# Patient Record
Sex: MALE | Race: WHITE | NOT HISPANIC OR LATINO | Employment: OTHER | URBAN - METROPOLITAN AREA
[De-identification: names, ages, dates, MRNs, and addresses within clinical notes are randomized per-mention and may not be internally consistent; named-entity substitution may affect disease eponyms.]

---

## 2019-04-15 ENCOUNTER — HOSPITAL ENCOUNTER (EMERGENCY)
Facility: HOSPITAL | Age: 84
Discharge: HOME | End: 2019-04-16
Attending: EMERGENCY MEDICINE
Payer: MEDICARE

## 2019-04-15 VITALS
WEIGHT: 136 LBS | TEMPERATURE: 99 F | SYSTOLIC BLOOD PRESSURE: 134 MMHG | OXYGEN SATURATION: 96 % | DIASTOLIC BLOOD PRESSURE: 61 MMHG | RESPIRATION RATE: 16 BRPM | HEART RATE: 75 BPM

## 2019-04-15 DIAGNOSIS — S09.90XA CLOSED HEAD INJURY, INITIAL ENCOUNTER: ICD-10-CM

## 2019-04-15 DIAGNOSIS — S01.81XA LACERATION OF FOREHEAD, INITIAL ENCOUNTER: ICD-10-CM
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
## 2019-04-15 PROCEDURE — 99284 EMERGENCY DEPT VISIT MOD MDM: CPT | Mod: ER

## 2019-04-15 RX ORDER — HYDROCODONE BITARTRATE AND ACETAMINOPHEN 10; 325 MG/1; MG/1
1 TABLET ORAL EVERY 4 HOURS PRN
COMMUNITY

## 2019-04-15 RX ORDER — METOPROLOL TARTRATE 50 MG/1
50 TABLET ORAL 2 TIMES DAILY
COMMUNITY

## 2019-04-15 RX ORDER — PANTOPRAZOLE SODIUM 40 MG/1
40 TABLET, DELAYED RELEASE ORAL DAILY
COMMUNITY

## 2019-04-15 RX ORDER — TAMSULOSIN HYDROCHLORIDE 0.4 MG/1
0.4 CAPSULE ORAL DAILY
COMMUNITY

## 2019-04-15 RX ORDER — FOLIC ACID 1 MG/1
1 TABLET ORAL DAILY
COMMUNITY

## 2019-04-15 RX ORDER — AMLODIPINE BESYLATE 2.5 MG/1
2.5 TABLET ORAL 2 TIMES DAILY
COMMUNITY

## 2019-04-15 RX ORDER — NAPROXEN SODIUM 220 MG/1
162 TABLET, FILM COATED ORAL 2 TIMES DAILY
COMMUNITY

## 2019-04-16 PROCEDURE — 25000003 PHARM REV CODE 250: Mod: ER | Performed by: EMERGENCY MEDICINE

## 2019-04-16 RX ADMIN — BACITRACIN, NEOMYCIN, POLYMYXIN B 1 EACH: 400; 3.5; 5 OINTMENT TOPICAL at 12:04

## 2019-04-16 NOTE — ED NOTES
Attempted to notify nurse at [facility] of patient discharge. Placed first call, spoke with Dorothea and was transferred to a different person. No answer. I attempted to call back for the second time to which there was no answer.

## 2019-04-16 NOTE — ED NOTES
LOC: The patient is awake, alert and aware of environment with an appropriate affect, the patient is oriented x 3 and speaking appropriately.  Brought in by [facility] with report that pt was found on floor after falling from wheelchair. Fall was unwitnessed  & there was no LOC per NH staff nurse Deidre.   Patient verbally verified and Spelled Full Name and Date of Birth.   APPEARANCE: Patient resting comfortably and in no acute distress,  patient's clothing is properly fastened.  HEENT: Superficial laceration to left temporal area, positive for hematoma to left cheek, no active bleeding  SKIN: Brief WNL.   MUSCULOSKELETAL: Brief WNL  RESPIRATORY: Brief WNL  CARDIAC: Brief WNL  GASTRO: Brief WNL  : Brief WNL  Peripheral Vasc: Brief WNL  NEURO: Brief WNL  PSYCH: Brief WNL

## 2019-04-16 NOTE — ED PROVIDER NOTES
Encounter Date: 4/15/2019       History     Chief Complaint   Patient presents with    Fall     unwitnessed fall from wheelchair at NH. laceration noted to left tempel. GCS 14. baseline. Follows commands     Nursing home resident on dialysis, unwitnessed fall this evening with minor contusion and minor laceration to the left lateral forehead/ periorbital region.  He is reported at his usual mildly demented baseline with no change in status.  Patient denies complaints.  He believes he is current on his tetanus vaccine.  Family confirms that he is at his baseline mental status.  Denies pain in the back, hips, chest, abdomen, neck, or head.  Although he is able to give some history and is cooperative, he is judged incompletely reliable and does not have a memory of the events.  Nursing home staff did not witness the fall and we cannot be sure whether he was sitting, lying, or standing prior to the event.  No other injury or complaints.    The history is provided by the patient, the nursing home, the EMS personnel and a relative. No  was used.     Review of patient's allergies indicates:  No Known Allergies  Past Medical History:   Diagnosis Date    Anemia     BPH (benign prostatic hyperplasia)     CHF (congestive heart failure)     CVA (cerebral vascular accident)     Hyperlipemia     Hypertension     Renal disorder      History reviewed. No pertinent surgical history.  History reviewed. No pertinent family history.  Social History     Tobacco Use    Smoking status: Unknown If Ever Smoked    Smokeless tobacco: Never Used   Substance Use Topics    Alcohol use: Never     Frequency: Never    Drug use: Never     Review of Systems   Constitutional: Negative for chills and fever.   HENT: Negative for congestion, facial swelling, nosebleeds and sinus pressure.    Eyes: Negative for pain and redness.   Respiratory: Negative for chest tightness, shortness of breath and wheezing.     Cardiovascular: Negative for chest pain, palpitations and leg swelling.   Gastrointestinal: Negative for abdominal distention, abdominal pain, diarrhea, nausea and vomiting.   Endocrine: Negative for cold intolerance, polydipsia and polyphagia.   Genitourinary: Negative for difficulty urinating, dysuria, frequency and hematuria.   Musculoskeletal: Negative for arthralgias, back pain, myalgias and neck pain.   Skin: Positive for wound. Negative for color change and rash.   Neurological: Negative for dizziness, weakness, numbness and headaches.   Hematological: Negative for adenopathy. Does not bruise/bleed easily.   Psychiatric/Behavioral: Negative for agitation and behavioral problems.   All other systems reviewed and are negative.      Physical Exam     Initial Vitals [04/15/19 2323]   BP Pulse Resp Temp SpO2   134/61 75 16 98.7 °F (37.1 °C) 96 %      MAP       --         Physical Exam    Nursing note and vitals reviewed.  Constitutional: He appears well-developed and well-nourished. He is not diaphoretic. No distress.   HENT:   Head: Normocephalic.   Mouth/Throat: Oropharynx is clear and moist. No oropharyngeal exudate.   Minor contusion, ecchymosis, and 2 cm superficial laceration over the left lateral forehead and periorbital region.  Good hemostasis.  No sutures judged necessary.  No other findings.   Eyes: Conjunctivae and EOM are normal. Pupils are equal, round, and reactive to light. Right eye exhibits no discharge. Left eye exhibits no discharge. No scleral icterus.   Neck: Normal range of motion. Neck supple. No thyromegaly present. No tracheal deviation present. No JVD present.   Cardiovascular: Normal rate, regular rhythm and normal heart sounds. Exam reveals no gallop and no friction rub.    No murmur heard.  Right upper arm dialysis shunt with good thrill, bruit, no sign of infection   Pulmonary/Chest: Breath sounds normal. No respiratory distress. He has no wheezes. He has no rhonchi. He has no  rales. He exhibits no tenderness.   Abdominal: Soft. Bowel sounds are normal. He exhibits no distension and no mass. There is no tenderness. There is no rebound and no guarding.   Musculoskeletal: Normal range of motion. He exhibits no edema or tenderness.   Well healed thoracic spine surgical scar with chronic deformity; no tenderness   Lymphadenopathy:     He has no cervical adenopathy.   Neurological: He is alert and oriented to person, place, and time. He has normal strength. No cranial nerve deficit.   Mild to moderate pleasant dementia evident   Skin: Skin is warm and dry. No rash noted. No erythema.   Psychiatric: He has a normal mood and affect. His behavior is normal. Judgment and thought content normal.         ED Course   Procedures  Labs Reviewed - No data to display       Imaging Results          CT Cervical Spine Without Contrast (In process)                CT Maxillofacial Without Contrast (In process)                CT Head Without Contrast (In process)               X-Rays:   Independently Interpreted Readings:   Other Readings:  CT scans of the head, maxillofacial bones, and cervical spine did not show any evidence for an acute traumatic process.      12:53 AM Remains perfectly stable, no change in exam, stable for return to nursing home, patient's family will take him by private vehicle.                         Clinical Impression:     1. Fall at nursing home, initial encounter    2. Laceration of forehead, initial encounter    3. Closed head injury, initial encounter         Disposition:   Disposition: Discharged  Condition: Stable                        Bernard Gonzalez MD  04/16/19 0054

## 2019-04-16 NOTE — ED NOTES
Wound to left temporal area cleansed, antbiotic oint & dressing applied.  Ace & hands cleansed of blood.  Pt remains alert & talkative, family at bedside

## 2019-07-22 ENCOUNTER — HOSPITAL ENCOUNTER (EMERGENCY)
Facility: HOSPITAL | Age: 84
Discharge: HOME | End: 2019-07-23
Attending: EMERGENCY MEDICINE
Payer: MEDICARE

## 2019-07-22 DIAGNOSIS — S00.03XA HEMATOMA OF SCALP, INITIAL ENCOUNTER: ICD-10-CM
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
## 2019-07-22 DIAGNOSIS — S09.90XA HEAD INJURY: ICD-10-CM

## 2019-07-22 DIAGNOSIS — S70.01XA CONTUSION OF RIGHT HIP, INITIAL ENCOUNTER: ICD-10-CM

## 2019-07-22 PROCEDURE — 99284 EMERGENCY DEPT VISIT MOD MDM: CPT | Mod: 25,ER

## 2019-07-23 ENCOUNTER — TELEPHONE (OUTPATIENT)
Dept: EMERGENCY MEDICINE | Facility: HOSPITAL | Age: 84
End: 2019-07-23

## 2019-07-23 VITALS
OXYGEN SATURATION: 99 % | TEMPERATURE: 98 F | HEIGHT: 67 IN | DIASTOLIC BLOOD PRESSURE: 62 MMHG | BODY MASS INDEX: 21.42 KG/M2 | RESPIRATION RATE: 20 BRPM | WEIGHT: 136.44 LBS | SYSTOLIC BLOOD PRESSURE: 135 MMHG | HEART RATE: 66 BPM

## 2019-07-23 NOTE — TELEPHONE ENCOUNTER
----- Message from Bernard Gonzalez MD sent at 7/23/2019  9:03 AM CDT -----  Radiology reading indicated a little more fluid in the lungs or possibly pneumonia; Dr. Berry had also seen the small pleural effusion on the right side.  He came in for a fall with injury, no real pulmonary complaints noted.  If no pulmonary complaints, recommend follow up with his doctor this week for a recheck.  If any pulmonary complaints, return to the ER or primary care today for a recheck.

## 2019-07-23 NOTE — ED PROVIDER NOTES
Encounter Date: 7/22/2019       History     Chief Complaint   Patient presents with    Fall     unwitnessed fall pta. unknown loc. hematoma noted to right side of head. reports falling from wheelchair.      The history is provided by the patient and the EMS personnel.   Fall   The accident occurred today. Fall occurred: wheelchair. He fell from a height of 3 to 5 ft. He landed on a hard floor. The point of impact was the head. The pain is at a severity of 3/10. He was not ambulatory at the scene. There was no entrapment after the fall. There was no drug use involved in the accident. There was no alcohol use involved in the accident. Associated symptoms include headaches. Pertinent negatives include no neck pain, no back pain, no paresthesias, no paralysis, no visual change, no fever, no numbness, no abdominal pain, no bowel incontinence, no nausea, no vomiting, no hematuria, no hearing loss, no loss of consciousness and no tingling. He has tried nothing for the symptoms.     Review of patient's allergies indicates:  No Known Allergies  Past Medical History:   Diagnosis Date    Anemia     BPH (benign prostatic hyperplasia)     CHF (congestive heart failure)     CVA (cerebral vascular accident)     Hyperlipemia     Hypertension     Renal disorder      No past surgical history on file.  No family history on file.  Social History     Tobacco Use    Smoking status: Unknown If Ever Smoked    Smokeless tobacco: Never Used   Substance Use Topics    Alcohol use: Never     Frequency: Never    Drug use: Never     Review of Systems   Constitutional: Negative for fever.   Gastrointestinal: Negative for abdominal pain, bowel incontinence, nausea and vomiting.   Genitourinary: Negative for hematuria.   Musculoskeletal: Negative for back pain and neck pain.   Neurological: Positive for headaches. Negative for tingling, loss of consciousness, numbness and paresthesias.   All other systems reviewed and are  negative.      Physical Exam     Initial Vitals [07/22/19 2316]   BP Pulse Resp Temp SpO2   130/61 (!) 18 (!) 66 97.8 °F (36.6 °C) 100 %      MAP       --         Physical Exam    Nursing note and vitals reviewed.  Constitutional: He appears well-developed and well-nourished. No distress.   HENT:   Head: Normocephalic.   Mouth/Throat: Oropharynx is clear and moist.   Frontal hematoma   Eyes: Conjunctivae and EOM are normal. Pupils are equal, round, and reactive to light.   Neck: Normal range of motion. Neck supple.   Cardiovascular: Normal rate and regular rhythm.   Pulmonary/Chest: Breath sounds normal.   Abdominal: Soft. Bowel sounds are normal.   Musculoskeletal: Normal range of motion. He exhibits tenderness.        Right hip: He exhibits tenderness. He exhibits normal range of motion.   Neurological: He is alert. He has normal strength.   Baseline confusion   Skin: Skin is warm and dry.   Psychiatric: He has a normal mood and affect.         ED Course   Procedures  Labs Reviewed - No data to display       Imaging Results          X-Ray Chest 1 View (Preliminary result)  Result time 07/23/19 00:54:34    ED Interpretation by Joon Berry MD (07/23/19 00:54:34, [facility], Emergency Medicine)    Right effusion, No acute fractures                             X-Ray Hips Bilateral 2 View Incl AP Pelvis (Preliminary result)  Result time 07/23/19 00:54:03    ED Interpretation by Joon Berry MD (07/23/19 00:54:03, [facility], Emergency Medicine)    NAF                             CT Head Without Contrast (In process)             No intracranial hem. NAD.    12:56 AM - Counseling: Spoke with the patient and discussed todays findings, in addition to providing specific details for the plan of care and counseling regarding the diagnosis and prognosis. Questions are answered at this time.     Trauma precautions were discussed with patient and/or  family/caretaker; I do not specifically detect any abdominal, thoracic, CNS, orthopedic, or other emergent or life threatening condition and that patient is safe to be discharged.  It was also discussed that despite an unrevealing examination and negative radiographic examination for serious or life threatening injury, these conditions may still exist.  As such, patient should return to ED immediately should they experience, severe or worsening pain, shortness of breath, abdominal pain, headache, vomiting, or any other concern.  It was also discussed that not infrequently, injuries may not be diagnosed during the initial ED visit (such as fractures) and that if the patient discovers a new area of concern, a new area of injury that was not evaluated in the ED, they should return for evaluation as they may have an injury that requires treatment.                            Clinical Impression:       ICD-10-CM ICD-9-CM   1. Fall W19.XXXA E888.9   2. Head injury S09.90XA 959.01   3. Hematoma of scalp, initial encounter S00.03XA 920   4. Contusion of right hip, initial encounter S70.01XA 924.01         Disposition:   Disposition: Discharged  Condition: Stable                        Joon Berry MD  07/23/19 0056

## 2019-07-23 NOTE — ED NOTES
Spoke with Mary, nurse at [facility]. States there will be someone to help assist getting the patient out of the car. Pt daughter to bring him back to nursing home.

## 2019-07-23 NOTE — ED NOTES
Patient's daughter at bedside. The patient is resting quietly. Airway is open and patent, respirations are spontaneous, normal respiratory effort and rate noted, skin warm and dry, appearance: in no acute distress and resting comfortably. Cardiac monitor in place. Side rails up x2, patient awake, alert, oriented to place, person, and situation. Disoriented to time, but patient's family states baseline.

## 2019-12-31 ENCOUNTER — HOSPITAL ENCOUNTER (EMERGENCY)
Facility: HOSPITAL | Age: 84
Discharge: HOME | End: 2019-12-31
Attending: EMERGENCY MEDICINE
Payer: MEDICARE

## 2019-12-31 VITALS
SYSTOLIC BLOOD PRESSURE: 136 MMHG | DIASTOLIC BLOOD PRESSURE: 75 MMHG | BODY MASS INDEX: 21.37 KG/M2 | HEART RATE: 79 BPM | RESPIRATION RATE: 18 BRPM | OXYGEN SATURATION: 98 % | TEMPERATURE: 98 F | WEIGHT: 136.44 LBS

## 2019-12-31 DIAGNOSIS — T07.XXXA ABRASIONS OF MULTIPLE SITES: ICD-10-CM

## 2019-12-31 DIAGNOSIS — S20.212A RIB CONTUSION, LEFT, INITIAL ENCOUNTER: ICD-10-CM

## 2019-12-31 DIAGNOSIS — S20.212A CONTUSION OF LEFT CHEST WALL: ICD-10-CM

## 2019-12-31 DIAGNOSIS — S00.81XA ABRASION OF FOREHEAD, INITIAL ENCOUNTER: ICD-10-CM

## 2019-12-31 DIAGNOSIS — S00.83XA CONTUSION OF FOREHEAD, INITIAL ENCOUNTER: Primary | ICD-10-CM

## 2019-12-31 DIAGNOSIS — S09.90XA HEAD TRAUMA: ICD-10-CM

## 2019-12-31 DIAGNOSIS — J90 CHRONIC PLEURAL EFFUSION: ICD-10-CM

## 2019-12-31 LAB — POCT GLUCOSE: 99 MG/DL (ref 70–110)

## 2019-12-31 PROCEDURE — 25000003 PHARM REV CODE 250: Mod: ER | Performed by: EMERGENCY MEDICINE

## 2019-12-31 PROCEDURE — 99285 EMERGENCY DEPT VISIT HI MDM: CPT | Mod: 25,ER

## 2019-12-31 PROCEDURE — 82962 GLUCOSE BLOOD TEST: CPT | Mod: ER

## 2019-12-31 RX ORDER — ACETAMINOPHEN 325 MG/1
325 TABLET ORAL EVERY 6 HOURS PRN
Refills: 0 | COMMUNITY
Start: 2019-12-31

## 2019-12-31 RX ORDER — ACETAMINOPHEN 500 MG
1000 TABLET ORAL
Status: COMPLETED | OUTPATIENT
Start: 2019-12-31 | End: 2019-12-31

## 2019-12-31 RX ADMIN — ACETAMINOPHEN 1000 MG: 500 TABLET ORAL at 07:12

## 2020-01-01 NOTE — DISCHARGE INSTRUCTIONS
The patient has family members that will observe him/her over the next 24 hours and that are competent to bring him/her back to the Emergency Department if concerning signs or symptoms develop. The family members are comfortable with this responsibility.  I have given detailed written and verbal instructions to the family to bring the patient back to the ED should any concerning signs such as excessive sleepiness, lethargy, confusion, unequal pupils, recurrent vomiting, seizure activity, loss of consciousness, or focal weakness develop.    Regarding CONTUSIONS, I advised patient to: REST the injured area or use it less than usual; apply ICE to decrease swelling and pain and help prevent tissue damage; use COMPRESSION with an elastic bandage to support the area and decrease swelling; ELEVATE injured body part above the level of the heart to help decrease pain and swelling; AVOID using massage or massage to acute injuries as it may slow healing of the area; AVOID drinking alcohol as it may slow healing of the injury; and avoid stretching injured muscles. Advised patient to return to the emergency department or contact primary care provider if: having trouble moving injured area; notice tingling or numbness in or near the injured area; extremity below the bruise gets cold or turns pale; a new lump develops in the injured area; symptoms do not improve with treatment after 4 to 5 days; there is any questions or concerns about the condition or treatment plan.     For RIB CONTUSION, I recommended that the patient perform deep breathing exercises to help prevent pneumonia (take 10 deep breaths every hour while awake); brace ribs with hands or a pillow while coughing or deep breathing to help decrease the pain; allow for adequate amount of rest to to decrease swelling and allow the injury to heal faster; avoid activities that may cause more pain or damage to ribs; apply ice packs to help decrease swelling and pain and  prevent tissue damage (use an ice pack or put crushed ice in a plastic bag and cover it with a towel and place it on injured area for 15 to 20 minutes every hour as directed).  I instructed patient to contact primary healthcare provider if they: develop a fever; notice increased bruising on chest; have chills and increased coughing; or have any questions or concerns about condition.  Advised patient to return to emergency department or call 911 if they suddenly have more severe chest pain; cough up blood; experience fever or chills and increased shortness of breath; have abdominal pain; suddenly feel lightheaded and short of breath, or notice tenderness and warmth to the arms or legs. Patient also instructed to take medications as prescribed and to avoid operating heavy machinery or driving while taking muscle relaxer's or pain medications.     I discussed wound care precautions; specifically, that all wounds have risk of infection despite efforts to cleanse and debride the wound; and there is a risk of an occult foreign body (and thus increased risk of infection) despite a negative examination.  I discussed with patient need to return for any signs of infection, specifically redness, increased pain, fever, drainage of pus, or any concern, immediately.

## 2020-01-01 NOTE — ED PROVIDER NOTES
Encounter Date: 12/31/2019       History     Chief Complaint   Patient presents with    Fall     from wheelchair pta, dime size hematoma to forehead      The history is provided by the patient, the EMS personnel and the nursing home.   Fall   The accident occurred today. Fall occurred: pt fell from wheelchair forward and hit forehead. pt is bed/wheelchair dependent. no focal neuro deficits. legs chronicly contracted bilaterally. He fell from a height of 3 to 5 ft. He landed on a hard floor. There was no blood loss. The point of impact was the head (forehead). The pain is present in the head. Pain scale: mild. He was not ambulatory at the scene. There was no entrapment after the fall. There was no drug use involved in the accident. There was no alcohol use involved in the accident. Pertinent negatives include no neck pain, no back pain, no paresthesias, no paralysis, no visual change, no fever, no numbness, no abdominal pain, no bowel incontinence, no nausea, no vomiting, no hematuria, no headaches, no hearing loss, no loss of consciousness and no tingling. The symptoms are aggravated by pressure on the injury. He has tried nothing for the symptoms. The treatment provided no relief.     Review of patient's allergies indicates:  No Known Allergies  Past Medical History:   Diagnosis Date    Anemia     BPH (benign prostatic hyperplasia)     CHF (congestive heart failure)     CVA (cerebral vascular accident)     Hyperlipemia     Hypertension     Renal disorder      History reviewed. No pertinent surgical history.  History reviewed. No pertinent family history.  Social History     Tobacco Use    Smoking status: Unknown If Ever Smoked    Smokeless tobacco: Never Used   Substance Use Topics    Alcohol use: Never     Frequency: Never    Drug use: Never     Review of Systems   Constitutional: Negative for fever.   HENT: Negative for sore throat.    Respiratory: Negative for shortness of breath.    Cardiovascular:  Negative for chest pain.   Gastrointestinal: Negative for abdominal pain, bowel incontinence, nausea and vomiting.   Genitourinary: Negative for dysuria and hematuria.   Musculoskeletal: Negative for back pain and neck pain.   Skin: Negative for rash.   Neurological: Negative for tingling, loss of consciousness, weakness, numbness, headaches and paresthesias.   Hematological: Does not bruise/bleed easily.   All other systems reviewed and are negative.      Physical Exam     Initial Vitals [12/31/19 1815]   BP Pulse Resp Temp SpO2   136/83 74 20 97.9 °F (36.6 °C) 98 %      MAP       --         Physical Exam    Nursing note and vitals reviewed.  Constitutional: Vital signs are normal. He appears well-developed and well-nourished. He is not diaphoretic. He is cooperative.  Non-toxic appearance. He does not have a sickly appearance. He does not appear ill. No distress.   Multiple old superficial abrasions. No active bleeding. Bilateral chronic leg contractions. No ulcers or overt signs of infection.   HENT:   Head: Normocephalic. Head is with abrasion (superficial abrasion to center of forehead. no active bleeding. no palpable skull fracture/skull depressions.  CN 2-12 intact. no focal neuro deficits. no neck pain.) and with contusion.       Right Ear: Hearing normal.   Left Ear: Hearing normal.   Nose: Nose normal.   Mouth/Throat: Uvula is midline, oropharynx is clear and moist and mucous membranes are normal.   Eyes: Conjunctivae, EOM and lids are normal. Pupils are equal, round, and reactive to light. No scleral icterus.   Neck: Trachea normal, normal range of motion, full passive range of motion without pain and phonation normal. Neck supple. No thyromegaly present.   Cardiovascular: Normal rate, regular rhythm, normal heart sounds and intact distal pulses. Exam reveals no gallop and no friction rub.    No murmur heard.  Pulmonary/Chest: Effort normal and breath sounds normal. No respiratory distress. He has no  decreased breath sounds. He has no wheezes. He has no rhonchi. He exhibits no tenderness.   Abdominal: Soft. Normal appearance and bowel sounds are normal. He exhibits no distension. There is no tenderness. There is no rigidity, no rebound, no guarding, no CVA tenderness, no tenderness at McBurney's point and negative Mathews's sign. No hernia.   Musculoskeletal: Normal range of motion. He exhibits no edema or tenderness.   Lymphadenopathy:     He has no cervical adenopathy.   Neurological: He is alert and oriented to person, place, and time. He has normal strength and normal reflexes. No cranial nerve deficit or sensory deficit.   Bilateral LE chronic contracted   Skin: Skin is warm and dry. Ecchymosis (large ecchymosis on left side of chest. no palpable fractures. no abrasions or lacerations in this area) noted.        Psychiatric: He has a normal mood and affect. His behavior is normal. Judgment and thought content normal.         ED Course   Procedures  Labs Reviewed   POCT GLUCOSE          Imaging Results          CT Head Without Contrast (Final result)  Result time 12/31/19 19:12:16    Final result by Emeterio Barkley MD (12/31/19 19:12:16)                 Impression:      No acute abnormality of the brain.  Chronic changes of small vessel disease and cerebral atrophy are present.    All CT scans at (this location) are performed using dose modulation techniques as appropriate to a performed exam including the following:  automated exposure control; adjustment of the mA and /or kV according to patient size (this includes techniques or standardized protocols for targeted exams where dose is matched to indication/reason for exam: i.e. extremities or head); use of iterative reconstruction technique.      Electronically signed by: Emeterio Barkley  Date:    12/31/2019  Time:    19:12             Narrative:    EXAMINATION:  CT HEAD WITHOUT CONTRAST    CLINICAL HISTORY:  Unspecified injury of head, initial  encounterFacial trauma, fx suspected;    TECHNIQUE:  Standard noncontrast CT of the brain.    All CT scans at this facility use dose modulation, iterative reconstruction and/or weight based dosing when appropriate to reduce radiation dose to as low as reasonably achievable.    COMPARISON:  07/23/2019    FINDINGS:  There are findings of cerebral atrophy with deepening of the cortical sulcal markings and dilatation of lateral ventricles.  Patchy decreased attenuation in the periventricular white matter suggests chronic changes of small vessel disease.    No mass effect or midline shift is demonstrated.  No acute hemorrhage or infarct is demonstrated.    Soft tissue swelling in the supraorbital region on the right is present.  No associated fracture is demonstrated.  The paranasal sinuses are clear.    The skull is grossly normal.                               X-Ray Chest 1 View (Final result)  Result time 12/31/19 19:15:34    Final result by Emeterio Barkley MD (12/31/19 19:15:34)                 Impression:      The left ribs and bones appear intact.  There is increase in the size of the moderate right-sided pleural effusion with associated consolidation and atelectasis in the right lower lobe.      Electronically signed by: Emeterio Barkley  Date:    12/31/2019  Time:    19:15             Narrative:    EXAMINATION:  XR CHEST 1 VIEW    CLINICAL HISTORY:  Contusion of left front wall of thorax, initial encounter    TECHNIQUE:  Single frontal view of the chest was performed.    COMPARISON:  07/23/2019    FINDINGS:  There is chronic cardiomegaly with sternal sutures from previous heart surgery.  Today's study again demonstrates a right-sided pleural effusion with extensive consolidation and atelectasis in the right lower lobe which appears worsened.  The pleural effusion appears increased in volume.  The left lung appears free of any new active infiltrate or effusion.  The bones appear intact.                                X-Ray Ribs 2 View Left (Final result)  Result time 12/31/19 19:13:38    Final result by Emeterio Barkley MD (12/31/19 19:13:38)                 Impression:      Left ribs appear intact.      Electronically signed by: Emeterio Barkley  Date:    12/31/2019  Time:    19:13             Narrative:    EXAMINATION:  XR RIBS 2 VIEW LEFT    CLINICAL HISTORY:  Contusion of left front wall of thorax, initial encounter    TECHNIQUE:  Two views of the left ribs were performed.    COMPARISON:  Chest x-ray 07/23/2019 is reviewed    FINDINGS:  The ribs appear intact.  The left lung appears free of any acute infiltrate or pneumothorax.  Chronic consolidation and atelectasis is present in the right lower lobe.                                       Vitals:    12/31/19 1815 12/31/19 1937 12/31/19 2032 12/31/19 2158   BP: 136/83 122/65 (!) 122/57 136/75   Pulse: 74 73 80 79   Resp: 20 20 19 18   Temp: 97.9 °F (36.6 °C)      TempSrc: Oral      SpO2: 98% (!) 94% (!) 94% 98%   Weight: 61.9 kg (136 lb 7.4 oz)          Results for orders placed or performed during the hospital encounter of 12/31/19   POCT glucose   Result Value Ref Range    POCT Glucose 99 70 - 110 mg/dL         Imaging Results          CT Head Without Contrast (Final result)  Result time 12/31/19 19:12:16    Final result by Emeterio Barkley MD (12/31/19 19:12:16)                 Impression:      No acute abnormality of the brain.  Chronic changes of small vessel disease and cerebral atrophy are present.    All CT scans at (this location) are performed using dose modulation techniques as appropriate to a performed exam including the following:  automated exposure control; adjustment of the mA and /or kV according to patient size (this includes techniques or standardized protocols for targeted exams where dose is matched to indication/reason for exam: i.e. extremities or head); use of iterative reconstruction technique.      Electronically signed by: Emeterio  Filippo  Date:    12/31/2019  Time:    19:12             Narrative:    EXAMINATION:  CT HEAD WITHOUT CONTRAST    CLINICAL HISTORY:  Unspecified injury of head, initial encounterFacial trauma, fx suspected;    TECHNIQUE:  Standard noncontrast CT of the brain.    All CT scans at this facility use dose modulation, iterative reconstruction and/or weight based dosing when appropriate to reduce radiation dose to as low as reasonably achievable.    COMPARISON:  07/23/2019    FINDINGS:  There are findings of cerebral atrophy with deepening of the cortical sulcal markings and dilatation of lateral ventricles.  Patchy decreased attenuation in the periventricular white matter suggests chronic changes of small vessel disease.    No mass effect or midline shift is demonstrated.  No acute hemorrhage or infarct is demonstrated.    Soft tissue swelling in the supraorbital region on the right is present.  No associated fracture is demonstrated.  The paranasal sinuses are clear.    The skull is grossly normal.                               X-Ray Chest 1 View (Final result)  Result time 12/31/19 19:15:34    Final result by Emeterio Barkley MD (12/31/19 19:15:34)                 Impression:      The left ribs and bones appear intact.  There is increase in the size of the moderate right-sided pleural effusion with associated consolidation and atelectasis in the right lower lobe.      Electronically signed by: Emeterio Barkley  Date:    12/31/2019  Time:    19:15             Narrative:    EXAMINATION:  XR CHEST 1 VIEW    CLINICAL HISTORY:  Contusion of left front wall of thorax, initial encounter    TECHNIQUE:  Single frontal view of the chest was performed.    COMPARISON:  07/23/2019    FINDINGS:  There is chronic cardiomegaly with sternal sutures from previous heart surgery.  Today's study again demonstrates a right-sided pleural effusion with extensive consolidation and atelectasis in the right lower lobe which appears worsened.  The  pleural effusion appears increased in volume.  The left lung appears free of any new active infiltrate or effusion.  The bones appear intact.                               X-Ray Ribs 2 View Left (Final result)  Result time 12/31/19 19:13:38    Final result by Emeterio Barkley MD (12/31/19 19:13:38)                 Impression:      Left ribs appear intact.      Electronically signed by: Emeterio Barkley  Date:    12/31/2019  Time:    19:13             Narrative:    EXAMINATION:  XR RIBS 2 VIEW LEFT    CLINICAL HISTORY:  Contusion of left front wall of thorax, initial encounter    TECHNIQUE:  Two views of the left ribs were performed.    COMPARISON:  Chest x-ray 07/23/2019 is reviewed    FINDINGS:  The ribs appear intact.  The left lung appears free of any acute infiltrate or pneumothorax.  Chronic consolidation and atelectasis is present in the right lower lobe.                                Medications   acetaminophen tablet 1,000 mg (1,000 mg Oral Given 12/31/19 1917)       7:24 PM - Re-evaluation: The patient is resting comfortably and is in no acute distress. He states that his symptoms have improved after treatment within ER. Discussed test results, shared treatment plan, specific conditions for return, and importance of follow up with patient and family.  He understands and agrees with the plan as discussed. Answered  his questions at this time. He has remained hemodynamically stable throughout the ED course and is appropriate for discharge home.     The patient has family members that will observe him/her over the next 24 hours and that are competent to bring him/her back to the Emergency Department if concerning signs or symptoms develop. The family members are comfortable with this responsibility.  I have given detailed written and verbal instructions to the family to bring the patient back to the ED should any concerning signs such as excessive sleepiness, lethargy, confusion, unequal pupils, recurrent  vomiting, seizure activity, loss of consciousness, or focal weakness develop.    Regarding CONTUSIONS, I advised patient to: REST the injured area or use it less than usual; apply ICE to decrease swelling and pain and help prevent tissue damage; use COMPRESSION with an elastic bandage to support the area and decrease swelling; ELEVATE injured body part above the level of the heart to help decrease pain and swelling; AVOID using massage or massage to acute injuries as it may slow healing of the area; AVOID drinking alcohol as it may slow healing of the injury; and avoid stretching injured muscles. Advised patient to return to the emergency department or contact primary care provider if: having trouble moving injured area; notice tingling or numbness in or near the injured area; extremity below the bruise gets cold or turns pale; a new lump develops in the injured area; symptoms do not improve with treatment after 4 to 5 days; there is any questions or concerns about the condition or treatment plan.     I discussed wound care precautions; specifically, that all wounds have risk of infection despite efforts to cleanse and debride the wound; and there is a risk of an occult foreign body (and thus increased risk of infection) despite a negative examination.  I discussed with patient need to return for any signs of infection, specifically redness, increased pain, fever, drainage of pus, or any concern, immediately.    Pre-hypertension/Hypertension: The pt has been informed that they may have pre-hypertension or hypertension based on a blood pressure reading in the ED. I recommend that the pt call the PCP listed on their discharge instructions or a physician of their choice this week to arrange f/u for further evaluation of possible pre-hypertension or hypertension.     Hwoie Vishal was given a handout which discussed their disease process, precautions, and instructions for follow-up and therapy.    Follow-up Information      Magdi Julio MD. Schedule an appointment as soon as possible for a visit in 1 week.    Specialty:  Internal Medicine  Contact information:  [address]  [address]  [place] [address]  [phone]             [facility].    Specialty:  Emergency Medicine  Why:  As needed, If symptoms worsen  Contact information:  [address]  [place] [address] [phone]                    Medication List      START taking these medications    acetaminophen 325 MG tablet  Commonly known as:  TYLENOL  Take 1 tablet (325 mg total) by mouth every 6 (six) hours as needed for Pain.        ASK your doctor about these medications    amLODIPine 2.5 MG tablet  Commonly known as:  NORVASC     aspirin 81 MG Chew     Flomax 0.4 mg Cap  Generic drug:  tamsulosin     folic acid 1 MG tablet  Commonly known as:  FOLVITE     HYDROcodone-acetaminophen  mg per tablet  Commonly known as:  NORCO     metoprolol tartrate 50 MG tablet  Commonly known as:  LOPRESSOR     pantoprazole 40 MG tablet  Commonly known as:  PROTONIX     RENVELA ORAL     ROBITUSSIN COUGH & COLD ORAL           Where to Get Your Medications      You can get these medications from any pharmacy    You don't need a prescription for these medications  · acetaminophen 325 MG tablet        Current Discharge Medication List            ED Diagnosis  1. Contusion of forehead, initial encounter    2. Head trauma    3. Contusion of left chest wall    4. Rib contusion, left, initial encounter    5. Chronic pleural effusion    6. Abrasion of forehead, initial encounter    7. Abrasions of multiple sites                                     Clinical Impression:       ICD-10-CM ICD-9-CM   1. Contusion of forehead, initial encounter S00.83XA 920   2. Head trauma S09.90XA 959.01   3. Contusion of left chest wall S20.212A 922.1   4. Rib contusion, left, initial encounter S20.212A 922.1   5. Chronic pleural effusion J90 511.9   6. Abrasion of  forehead, initial encounter S00.81XA 910.0   7. Abrasions of multiple sites T07.XXXA 919.0         Disposition:   Disposition: Discharged  Condition: Stable                     Gustavo Wilkerson Jr., MD  01/01/20 0348

## 2020-01-09 ENCOUNTER — HOSPITAL ENCOUNTER (OUTPATIENT)
Facility: HOSPITAL | Age: 85
Discharge: NURSING, REHAB OR LONG-TERM CARE | End: 2020-01-11
Attending: EMERGENCY MEDICINE | Admitting: INTERNAL MEDICINE
Payer: MEDICARE

## 2020-01-09 DIAGNOSIS — E87.6 HYPOKALEMIA: ICD-10-CM

## 2020-01-09 DIAGNOSIS — J90 PLEURAL EFFUSION, RIGHT: Primary | ICD-10-CM

## 2020-01-09 DIAGNOSIS — J90 PLEURAL EFFUSION: ICD-10-CM

## 2020-01-09 DIAGNOSIS — Z99.2 ESRD (END STAGE RENAL DISEASE) ON DIALYSIS: ICD-10-CM
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
## 2020-01-09 DIAGNOSIS — I50.9 CHF (CONGESTIVE HEART FAILURE): ICD-10-CM

## 2020-01-09 DIAGNOSIS — R58 ECCHYMOSIS: ICD-10-CM

## 2020-01-09 DIAGNOSIS — S00.83XA TRAUMATIC HEMATOMA OF FOREHEAD, INITIAL ENCOUNTER: ICD-10-CM

## 2020-01-09 DIAGNOSIS — N18.6 END STAGE RENAL DISEASE: ICD-10-CM

## 2020-01-09 DIAGNOSIS — N18.6 ESRD (END STAGE RENAL DISEASE) ON DIALYSIS: ICD-10-CM

## 2020-01-09 LAB
ALBUMIN SERPL BCP-MCNC: 3.2 G/DL (ref 3.5–5.2)
ALP SERPL-CCNC: 101 U/L (ref 55–135)
ALT SERPL W/O P-5'-P-CCNC: 25 U/L (ref 10–44)
ANION GAP SERPL CALC-SCNC: 14 MMOL/L (ref 8–16)
APTT BLDCRRT: 29.2 SEC (ref 21–32)
AST SERPL-CCNC: 40 U/L (ref 10–40)
BASOPHILS # BLD AUTO: 0.01 K/UL (ref 0–0.2)
BASOPHILS NFR BLD: 0.1 % (ref 0–1.9)
BILIRUB SERPL-MCNC: 1.2 MG/DL (ref 0.1–1)
BNP SERPL-MCNC: >4900 PG/ML (ref 0–99)
BUN SERPL-MCNC: 27 MG/DL (ref 8–23)
CALCIUM SERPL-MCNC: 10 MG/DL (ref 8.7–10.5)
CHLORIDE SERPL-SCNC: 99 MMOL/L (ref 95–110)
CO2 SERPL-SCNC: 29 MMOL/L (ref 23–29)
CREAT SERPL-MCNC: 2.8 MG/DL (ref 0.5–1.4)
DIFFERENTIAL METHOD: ABNORMAL
EOSINOPHIL # BLD AUTO: 0 K/UL (ref 0–0.5)
EOSINOPHIL NFR BLD: 0.2 % (ref 0–8)
ERYTHROCYTE [DISTWIDTH] IN BLOOD BY AUTOMATED COUNT: 17.6 % (ref 11.5–14.5)
EST. GFR  (AFRICAN AMERICAN): 22.7 ML/MIN/1.73 M^2
EST. GFR  (NON AFRICAN AMERICAN): 19.7 ML/MIN/1.73 M^2
GLUCOSE SERPL-MCNC: 205 MG/DL (ref 70–110)
HCT VFR BLD AUTO: 37.5 % (ref 40–54)
HGB BLD-MCNC: 11.4 G/DL (ref 14–18)
IMM GRANULOCYTES # BLD AUTO: 0.08 K/UL (ref 0–0.04)
IMM GRANULOCYTES NFR BLD AUTO: 0.6 % (ref 0–0.5)
INR PPP: 1.1 (ref 0.8–1.2)
LIPASE SERPL-CCNC: 22 U/L (ref 4–60)
LYMPHOCYTES # BLD AUTO: 0.5 K/UL (ref 1–4.8)
LYMPHOCYTES NFR BLD: 3.8 % (ref 18–48)
MCH RBC QN AUTO: 27.3 PG (ref 27–31)
MCHC RBC AUTO-ENTMCNC: 30.4 G/DL (ref 32–36)
MCV RBC AUTO: 90 FL (ref 82–98)
MONOCYTES # BLD AUTO: 1.1 K/UL (ref 0.3–1)
MONOCYTES NFR BLD: 8.4 % (ref 4–15)
NEUTROPHILS # BLD AUTO: 11.8 K/UL (ref 1.8–7.7)
NEUTROPHILS NFR BLD: 86.9 % (ref 38–73)
NRBC BLD-RTO: 0 /100 WBC
PLATELET # BLD AUTO: 368 K/UL (ref 150–350)
PMV BLD AUTO: 10 FL (ref 9.2–12.9)
POTASSIUM SERPL-SCNC: 3.1 MMOL/L (ref 3.5–5.1)
PROT SERPL-MCNC: 7.5 G/DL (ref 6–8.4)
PROTHROMBIN TIME: 11.1 SEC (ref 9–12.5)
RBC # BLD AUTO: 4.18 M/UL (ref 4.6–6.2)
SODIUM SERPL-SCNC: 142 MMOL/L (ref 136–145)
TROPONIN I SERPL DL<=0.01 NG/ML-MCNC: 0.18 NG/ML (ref 0–0.03)
WBC # BLD AUTO: 13.59 K/UL (ref 3.9–12.7)

## 2020-01-09 PROCEDURE — 85610 PROTHROMBIN TIME: CPT | Mod: ER

## 2020-01-09 PROCEDURE — 93005 ELECTROCARDIOGRAM TRACING: CPT | Mod: ER

## 2020-01-09 PROCEDURE — 25000003 PHARM REV CODE 250: Mod: ER | Performed by: EMERGENCY MEDICINE

## 2020-01-09 PROCEDURE — 93010 EKG 12-LEAD: ICD-10-PCS | Mod: ,,, | Performed by: INTERNAL MEDICINE

## 2020-01-09 PROCEDURE — 83880 ASSAY OF NATRIURETIC PEPTIDE: CPT | Mod: ER

## 2020-01-09 PROCEDURE — 85730 THROMBOPLASTIN TIME PARTIAL: CPT | Mod: ER

## 2020-01-09 PROCEDURE — 99285 EMERGENCY DEPT VISIT HI MDM: CPT | Mod: 25,ER

## 2020-01-09 PROCEDURE — 83690 ASSAY OF LIPASE: CPT | Mod: ER

## 2020-01-09 PROCEDURE — 80053 COMPREHEN METABOLIC PANEL: CPT | Mod: ER

## 2020-01-09 PROCEDURE — 85025 COMPLETE CBC W/AUTO DIFF WBC: CPT | Mod: ER

## 2020-01-09 PROCEDURE — 93010 ELECTROCARDIOGRAM REPORT: CPT | Mod: ,,, | Performed by: INTERNAL MEDICINE

## 2020-01-09 PROCEDURE — 84484 ASSAY OF TROPONIN QUANT: CPT | Mod: ER

## 2020-01-09 RX ORDER — POTASSIUM CHLORIDE 20 MEQ/1
40 TABLET, EXTENDED RELEASE ORAL
Status: ACTIVE | OUTPATIENT
Start: 2020-01-09 | End: 2020-01-10

## 2020-01-09 RX ORDER — ESCITALOPRAM OXALATE 10 MG/1
10 TABLET ORAL NIGHTLY
COMMUNITY

## 2020-01-10 PROBLEM — Z99.2 ESRD (END STAGE RENAL DISEASE) ON DIALYSIS: Status: ACTIVE | Noted: 2017-08-02

## 2020-01-10 PROBLEM — I50.43 ACUTE ON CHRONIC COMBINED SYSTOLIC AND DIASTOLIC HEART FAILURE: Status: ACTIVE | Noted: 2020-01-10

## 2020-01-10 PROBLEM — R79.89 ELEVATED TROPONIN: Status: ACTIVE | Noted: 2020-01-10

## 2020-01-10 PROBLEM — N18.6 ESRD (END STAGE RENAL DISEASE) ON DIALYSIS: Status: ACTIVE | Noted: 2017-08-02

## 2020-01-10 LAB
APPEARANCE FLD: CLEAR
BODY FLD TYPE: NORMAL
COLOR FLD: YELLOW
GRAM STN SPEC: NORMAL
GRAM STN SPEC: NORMAL
LDH SERPL L TO P-CCNC: 247 U/L (ref 110–260)
LYMPHOCYTES NFR FLD MANUAL: 61 %
MONOS+MACROS NFR FLD MANUAL: 27 %
NEUTROPHILS NFR FLD MANUAL: 12 %
TROPONIN I SERPL DL<=0.01 NG/ML-MCNC: 0.11 NG/ML (ref 0–0.03)
TROPONIN I SERPL DL<=0.01 NG/ML-MCNC: 0.12 NG/ML (ref 0–0.03)
WBC # FLD: 119 /CU MM

## 2020-01-10 PROCEDURE — 99203 PR OFFICE/OUTPT VISIT, NEW, LEVL III, 30-44 MIN: ICD-10-PCS | Mod: ,,, | Performed by: INTERNAL MEDICINE

## 2020-01-10 PROCEDURE — 88305 TISSUE EXAM BY PATHOLOGIST: CPT | Performed by: PATHOLOGY

## 2020-01-10 PROCEDURE — G0378 HOSPITAL OBSERVATION PER HR: HCPCS | Mod: ER

## 2020-01-10 PROCEDURE — 88112 PR  CYTOPATH, CELL ENHANCE TECH: ICD-10-PCS | Mod: 26,,, | Performed by: PATHOLOGY

## 2020-01-10 PROCEDURE — 87075 CULTR BACTERIA EXCEPT BLOOD: CPT

## 2020-01-10 PROCEDURE — 93306 TTE W/DOPPLER COMPLETE: CPT

## 2020-01-10 PROCEDURE — 88112 CYTOPATH CELL ENHANCE TECH: CPT | Mod: 26,,, | Performed by: PATHOLOGY

## 2020-01-10 PROCEDURE — 88112 CYTOPATH CELL ENHANCE TECH: CPT | Performed by: PATHOLOGY

## 2020-01-10 PROCEDURE — G0378 HOSPITAL OBSERVATION PER HR: HCPCS

## 2020-01-10 PROCEDURE — 63600175 PHARM REV CODE 636 W HCPCS: Performed by: PHYSICIAN ASSISTANT

## 2020-01-10 PROCEDURE — 83615 LACTATE (LD) (LDH) ENZYME: CPT

## 2020-01-10 PROCEDURE — 99203 OFFICE O/P NEW LOW 30 MIN: CPT | Mod: ,,, | Performed by: INTERNAL MEDICINE

## 2020-01-10 PROCEDURE — 89051 BODY FLUID CELL COUNT: CPT

## 2020-01-10 PROCEDURE — 82042 OTHER SOURCE ALBUMIN QUAN EA: CPT

## 2020-01-10 PROCEDURE — 88305 TISSUE EXAM BY PATHOLOGIST: ICD-10-PCS | Mod: 26,,, | Performed by: PATHOLOGY

## 2020-01-10 PROCEDURE — 88305 TISSUE EXAM BY PATHOLOGIST: CPT | Mod: 26,,, | Performed by: PATHOLOGY

## 2020-01-10 PROCEDURE — 25000003 PHARM REV CODE 250: Performed by: PHYSICIAN ASSISTANT

## 2020-01-10 PROCEDURE — 84157 ASSAY OF PROTEIN OTHER: CPT

## 2020-01-10 PROCEDURE — 87206 SMEAR FLUORESCENT/ACID STAI: CPT

## 2020-01-10 PROCEDURE — 93306 TTE W/DOPPLER COMPLETE: CPT | Mod: 26,,, | Performed by: INTERNAL MEDICINE

## 2020-01-10 PROCEDURE — 83615 LACTATE (LD) (LDH) ENZYME: CPT | Mod: 91

## 2020-01-10 PROCEDURE — 84484 ASSAY OF TROPONIN QUANT: CPT

## 2020-01-10 PROCEDURE — 87116 MYCOBACTERIA CULTURE: CPT

## 2020-01-10 PROCEDURE — 36415 COLL VENOUS BLD VENIPUNCTURE: CPT

## 2020-01-10 PROCEDURE — 93306 2D ECHO WITH COLOR FLOW DOPPLER: ICD-10-PCS | Mod: 26,,, | Performed by: INTERNAL MEDICINE

## 2020-01-10 PROCEDURE — 87070 CULTURE OTHR SPECIMN AEROBIC: CPT

## 2020-01-10 PROCEDURE — 82945 GLUCOSE OTHER FLUID: CPT

## 2020-01-10 PROCEDURE — 87205 SMEAR GRAM STAIN: CPT

## 2020-01-10 RX ORDER — ONDANSETRON 8 MG/1
8 TABLET, ORALLY DISINTEGRATING ORAL EVERY 8 HOURS PRN
Status: DISCONTINUED | OUTPATIENT
Start: 2020-01-10 | End: 2020-01-11 | Stop reason: HOSPADM

## 2020-01-10 RX ORDER — HYDROCODONE BITARTRATE AND ACETAMINOPHEN 5; 325 MG/1; MG/1
1 TABLET ORAL EVERY 6 HOURS PRN
Status: DISCONTINUED | OUTPATIENT
Start: 2020-01-10 | End: 2020-01-11 | Stop reason: HOSPADM

## 2020-01-10 RX ORDER — SEVELAMER CARBONATE 800 MG/1
800 TABLET, FILM COATED ORAL
Status: DISCONTINUED | OUTPATIENT
Start: 2020-01-10 | End: 2020-01-11 | Stop reason: HOSPADM

## 2020-01-10 RX ORDER — ESCITALOPRAM OXALATE 10 MG/1
10 TABLET ORAL NIGHTLY
Status: DISCONTINUED | OUTPATIENT
Start: 2020-01-10 | End: 2020-01-11 | Stop reason: HOSPADM

## 2020-01-10 RX ORDER — METOPROLOL TARTRATE 50 MG/1
50 TABLET ORAL 2 TIMES DAILY
Status: DISCONTINUED | OUTPATIENT
Start: 2020-01-10 | End: 2020-01-11 | Stop reason: HOSPADM

## 2020-01-10 RX ORDER — PSEUDOEPHEDRINE/ACETAMINOPHEN 30MG-500MG
100 TABLET ORAL
Status: COMPLETED | OUTPATIENT
Start: 2020-01-10 | End: 2020-01-10

## 2020-01-10 RX ORDER — PANTOPRAZOLE SODIUM 40 MG/1
40 TABLET, DELAYED RELEASE ORAL DAILY
Status: DISCONTINUED | OUTPATIENT
Start: 2020-01-10 | End: 2020-01-11 | Stop reason: HOSPADM

## 2020-01-10 RX ORDER — TAMSULOSIN HYDROCHLORIDE 0.4 MG/1
0.4 CAPSULE ORAL DAILY
Status: DISCONTINUED | OUTPATIENT
Start: 2020-01-10 | End: 2020-01-11 | Stop reason: HOSPADM

## 2020-01-10 RX ORDER — IPRATROPIUM BROMIDE AND ALBUTEROL SULFATE 2.5; .5 MG/3ML; MG/3ML
3 SOLUTION RESPIRATORY (INHALATION) EVERY 6 HOURS PRN
Status: DISCONTINUED | OUTPATIENT
Start: 2020-01-10 | End: 2020-01-11 | Stop reason: HOSPADM

## 2020-01-10 RX ORDER — ACETAMINOPHEN 325 MG/1
650 TABLET ORAL EVERY 6 HOURS PRN
Status: DISCONTINUED | OUTPATIENT
Start: 2020-01-10 | End: 2020-01-11 | Stop reason: HOSPADM

## 2020-01-10 RX ORDER — AMLODIPINE BESYLATE 2.5 MG/1
2.5 TABLET ORAL 2 TIMES DAILY
Status: DISCONTINUED | OUTPATIENT
Start: 2020-01-10 | End: 2020-01-11 | Stop reason: HOSPADM

## 2020-01-10 RX ORDER — FOLIC ACID 1 MG/1
1 TABLET ORAL DAILY
Status: DISCONTINUED | OUTPATIENT
Start: 2020-01-10 | End: 2020-01-11 | Stop reason: HOSPADM

## 2020-01-10 RX ORDER — SYRING-NEEDL,DISP,INSUL,0.3 ML 29 G X1/2"
296 SYRINGE, EMPTY DISPOSABLE MISCELLANEOUS
Status: COMPLETED | OUTPATIENT
Start: 2020-01-10 | End: 2020-01-10

## 2020-01-10 RX ADMIN — AMLODIPINE BESYLATE 2.5 MG: 2.5 TABLET ORAL at 08:01

## 2020-01-10 RX ADMIN — PANTOPRAZOLE SODIUM 40 MG: 40 TABLET, DELAYED RELEASE ORAL at 02:01

## 2020-01-10 RX ADMIN — ESCITALOPRAM OXALATE 10 MG: 10 TABLET ORAL at 08:01

## 2020-01-10 RX ADMIN — TAMSULOSIN HYDROCHLORIDE 0.4 MG: 0.4 CAPSULE ORAL at 02:01

## 2020-01-10 RX ADMIN — METOPROLOL TARTRATE 50 MG: 50 TABLET ORAL at 08:01

## 2020-01-10 RX ADMIN — FOLIC ACID 1 MG: 1 TABLET ORAL at 02:01

## 2020-01-10 RX ADMIN — SODIUM CHLORIDE 500 ML: 0.9 INJECTION, SOLUTION INTRAVENOUS at 02:01

## 2020-01-10 RX ADMIN — SEVELAMER CARBONATE 800 MG: 800 TABLET, FILM COATED ORAL at 04:01

## 2020-01-10 RX ADMIN — Medication 100 ML: at 02:01

## 2020-01-10 RX ADMIN — MAGNESIUM CITRATE 296 ML: 1.75 LIQUID ORAL at 02:01

## 2020-01-10 RX ADMIN — AMLODIPINE BESYLATE 2.5 MG: 2.5 TABLET ORAL at 02:01

## 2020-01-10 NOTE — PLAN OF CARE
Upon discharge [facility] will need to be called to give report to and  coordinate transportation back to their facility.  His nurse is April with [facility] and she can be reached at [phone].

## 2020-01-10 NOTE — PLAN OF CARE
Pt awake, alert and confused.  VSS.  Pt remained afebrile throughout this shift.   All meds administered per order.   Right thoracentesis done today by IR, sample fluid sent to lab.   Pt remained free of falls this shift.   Pt had no complaints of pain  Plan of care reviewed. Patient verbalizes understanding.   Pt moving/turning with assistance.   Patient NSR on monitor  Side rails up x 2, wheels locked, call light in reach.   Patient instructed to call for assistance.   Will continue to monitor

## 2020-01-10 NOTE — ED NOTES
Dorothea from [facility] called report at this time.  States patient fell out of wheelchair prior to patient's arrival, has had multiple falls over the last 3 weeks.  Patient is dialysis on Monday, Wednesday and Friday.

## 2020-01-10 NOTE — SUBJECTIVE & OBJECTIVE
Past Medical History:   Diagnosis Date    Acute on chronic combined systolic and diastolic congestive heart failure     Anemia     BPH (benign prostatic hyperplasia)     CAD (coronary artery disease)     COPD (chronic obstructive pulmonary disease)     CVA (cerebral vascular accident)     ESRD on dialysis     GERD (gastroesophageal reflux disease)     History of DVT (deep vein thrombosis)     Hyperlipemia     Hypertension        History reviewed. No pertinent surgical history.    Review of patient's allergies indicates:  No Known Allergies  No current facility-administered medications for this encounter.      Family History     None        Tobacco Use    Smoking status: Unknown If Ever Smoked    Smokeless tobacco: Never Used   Substance and Sexual Activity    Alcohol use: Never     Frequency: Never    Drug use: Never    Sexual activity: Not Currently     Review of Systems   Unable to perform ROS: Dementia   Constitutional: Positive for activity change and appetite change. Negative for chills, diaphoresis, fatigue and fever.   HENT: Positive for congestion. Negative for trouble swallowing.    Eyes: Negative.    Respiratory: Positive for cough and shortness of breath. Negative for chest tightness and wheezing.    Cardiovascular: Negative.  Negative for chest pain, palpitations and leg swelling.   Gastrointestinal: Negative.  Negative for abdominal distention, abdominal pain, nausea and vomiting.   Genitourinary: Negative.  Negative for decreased urine volume, difficulty urinating, dysuria, enuresis, flank pain, frequency, hematuria, penile swelling, scrotal swelling and urgency.   Musculoskeletal: Negative.  Negative for arthralgias, back pain, joint swelling and neck pain.   Skin: Negative for rash.   Neurological: Positive for dizziness, speech difficulty and weakness. Negative for tremors, seizures and headaches.   Psychiatric/Behavioral: Negative.  Negative for confusion and sleep disturbance. The  patient is not nervous/anxious.    All other systems reviewed and are negative.    Objective:     Vital Signs (Most Recent):  Temp: 98.5 °F (36.9 °C) (01/10/20 1123)  Pulse: 79 (01/10/20 1123)  Resp: 18 (01/10/20 1123)  BP: (!) 152/67 (01/10/20 1123)  SpO2: 95 % (01/10/20 1123)  O2 Device (Oxygen Therapy): room air (01/10/20 0930) Vital Signs (24h Range):  Temp:  [98 °F (36.7 °C)-98.6 °F (37 °C)] 98.5 °F (36.9 °C)  Pulse:  [72-79] 79  Resp:  [18-31] 18  SpO2:  [91 %-98 %] 95 %  BP: (101-152)/(49-68) 152/67     Weight: 59.4 kg (131 lb) (01/09/20 1938)  Body mass index is 19.92 kg/m².  Body surface area is 1.69 meters squared.    No intake/output data recorded.    Physical Exam   Constitutional: He appears well-developed. He appears lethargic. No distress.   HENT:   Head: Normocephalic.   Left frontal bruising which is chronic/older   Eyes: Pupils are equal, round, and reactive to light. EOM are normal.   Neck: Normal range of motion. Neck supple. No JVD present. No thyromegaly present.   Cardiovascular: Normal rate, regular rhythm, S1 normal, S2 normal and intact distal pulses. PMI is displaced. Exam reveals no gallop and no friction rub.   Murmur heard.   Crescendo systolic murmur is present with a grade of 3/6.   Diastolic murmur is present with a grade of 2/6.  Pulses:       Carotid pulses are 1+ on the right side, and 1+ on the left side.       Radial pulses are 1+ on the right side, and 1+ on the left side.        Femoral pulses are 1+ on the right side, and 1+ on the left side.       Popliteal pulses are 1+ on the right side, and 1+ on the left side.        Dorsalis pedis pulses are 1+ on the right side, and 1+ on the left side.        Posterior tibial pulses are 1+ on the right side, and 1+ on the left side.   Pulmonary/Chest: Effort normal. No respiratory distress. He has decreased breath sounds in the right upper field, the right middle field, the right lower field and the left lower field. He has wheezes.  He has rhonchi. He has rales. He exhibits no tenderness.   Abdominal: He exhibits no distension and no mass. There is no hepatosplenomegaly. There is no tenderness. There is no rebound and no CVA tenderness. No hernia.   Musculoskeletal: Normal range of motion. He exhibits no edema or tenderness.   Severe muscle wasting.  Severe arthritis with bony abnormalities.  Patient laying in the right side.    Right upper arm fistula positive for bruit and thrill   Lymphadenopathy:     He has no cervical adenopathy.   Neurological: He appears lethargic. He is not disoriented. He displays abnormal reflex. No cranial nerve deficit. He exhibits normal muscle tone. Coordination normal.   Skin: Skin is warm and dry. No rash noted. No erythema.   Psychiatric: He has a normal mood and affect. His behavior is normal. Judgment and thought content normal.       Significant Labs:  All labs within the past 24 hours have been reviewed.    Significant Imaging:  Labs: Reviewed  X-Ray: Reviewed  CT: Reviewed

## 2020-01-10 NOTE — ASSESSMENT & PLAN NOTE
-Plans for IR to perform diagnostic and therapeutic thoracentesis on 1/10/20.   -Continue fluid removal with dialysis per Nephrology.

## 2020-01-10 NOTE — CONSULTS
[facility] -   Nephrology  Consult Note        Patient Name: Howie Rodgers  MRN: [id number]  Admission Date: 1/9/2020  Hospital Length of Stay: 0 days  Attending Provider: Yvon Moore, *   Primary Care Physician: Magdi Julio MD  Principal Problem:<principal problem not specified>    Consults  Subjective:     HPI: Patient is an 85-year-old male with history of ESRD on hemodialysis under care of [facility] Dr. Bernard Garcia at [facility].  Has right upper arm fistula for access.  Patient has extensive atherosclerosis and has had history of falls with severe osteoporosis.  Patient comes in with some shortness of breath and right-sided pleural effusion needing thoracentesis.  Nephrology consultation is requested for provision of hemodialysis while inpatient.  Patient seen and examined at the bedside.  Patient has some chronic confusional state/dementia and his history is not completely reliable.  Majority of the information is obtained from [facility] as well as from records    Past Medical History:   Diagnosis Date    Acute on chronic combined systolic and diastolic congestive heart failure     Anemia     BPH (benign prostatic hyperplasia)     CAD (coronary artery disease)     COPD (chronic obstructive pulmonary disease)     CVA (cerebral vascular accident)     ESRD on dialysis     GERD (gastroesophageal reflux disease)     History of DVT (deep vein thrombosis)     Hyperlipemia     Hypertension        History reviewed. No pertinent surgical history.    Review of patient's allergies indicates:  No Known Allergies  No current facility-administered medications for this encounter.      Family History     None        Tobacco Use    Smoking status: Unknown If Ever Smoked    Smokeless tobacco: Never Used   Substance and Sexual Activity    Alcohol use: Never     Frequency: Never    Drug use: Never    Sexual activity: Not Currently     Review of Systems    Unable to perform ROS: Dementia   Constitutional: Positive for activity change and appetite change. Negative for chills, diaphoresis, fatigue and fever.   HENT: Positive for congestion. Negative for trouble swallowing.    Eyes: Negative.    Respiratory: Positive for cough and shortness of breath. Negative for chest tightness and wheezing.    Cardiovascular: Negative.  Negative for chest pain, palpitations and leg swelling.   Gastrointestinal: Negative.  Negative for abdominal distention, abdominal pain, nausea and vomiting.   Genitourinary: Negative.  Negative for decreased urine volume, difficulty urinating, dysuria, enuresis, flank pain, frequency, hematuria, penile swelling, scrotal swelling and urgency.   Musculoskeletal: Negative.  Negative for arthralgias, back pain, joint swelling and neck pain.   Skin: Negative for rash.   Neurological: Positive for dizziness, speech difficulty and weakness. Negative for tremors, seizures and headaches.   Psychiatric/Behavioral: Negative.  Negative for confusion and sleep disturbance. The patient is not nervous/anxious.    All other systems reviewed and are negative.    Objective:     Vital Signs (Most Recent):  Temp: 98.5 °F (36.9 °C) (01/10/20 1123)  Pulse: 79 (01/10/20 1123)  Resp: 18 (01/10/20 1123)  BP: (!) 152/67 (01/10/20 1123)  SpO2: 95 % (01/10/20 1123)  O2 Device (Oxygen Therapy): room air (01/10/20 0930) Vital Signs (24h Range):  Temp:  [98 °F (36.7 °C)-98.6 °F (37 °C)] 98.5 °F (36.9 °C)  Pulse:  [72-79] 79  Resp:  [18-31] 18  SpO2:  [91 %-98 %] 95 %  BP: (101-152)/(49-68) 152/67     Weight: 59.4 kg (131 lb) (01/09/20 1938)  Body mass index is 19.92 kg/m².  Body surface area is 1.69 meters squared.    No intake/output data recorded.    Physical Exam   Constitutional: He appears well-developed. He appears lethargic. No distress.   HENT:   Head: Normocephalic.   Left frontal bruising which is chronic/older   Eyes: Pupils are equal, round, and reactive to light.  EOM are normal.   Neck: Normal range of motion. Neck supple. No JVD present. No thyromegaly present.   Cardiovascular: Normal rate, regular rhythm, S1 normal, S2 normal and intact distal pulses. PMI is displaced. Exam reveals no gallop and no friction rub.   Murmur heard.   Crescendo systolic murmur is present with a grade of 3/6.   Diastolic murmur is present with a grade of 2/6.  Pulses:       Carotid pulses are 1+ on the right side, and 1+ on the left side.       Radial pulses are 1+ on the right side, and 1+ on the left side.        Femoral pulses are 1+ on the right side, and 1+ on the left side.       Popliteal pulses are 1+ on the right side, and 1+ on the left side.        Dorsalis pedis pulses are 1+ on the right side, and 1+ on the left side.        Posterior tibial pulses are 1+ on the right side, and 1+ on the left side.   Pulmonary/Chest: Effort normal. No respiratory distress. He has decreased breath sounds in the right upper field, the right middle field, the right lower field and the left lower field. He has wheezes. He has rhonchi. He has rales. He exhibits no tenderness.   Abdominal: He exhibits no distension and no mass. There is no hepatosplenomegaly. There is no tenderness. There is no rebound and no CVA tenderness. No hernia.   Musculoskeletal: Normal range of motion. He exhibits no edema or tenderness.   Severe muscle wasting.  Severe arthritis with bony abnormalities.  Patient laying in the right side.    Right upper arm fistula positive for bruit and thrill   Lymphadenopathy:     He has no cervical adenopathy.   Neurological: He appears lethargic. He is not disoriented. He displays abnormal reflex. No cranial nerve deficit. He exhibits normal muscle tone. Coordination normal.   Skin: Skin is warm and dry. No rash noted. No erythema.   Psychiatric: He has a normal mood and affect. His behavior is normal. Judgment and thought content normal.       Significant Labs:  All labs within the past 24  hours have been reviewed.    Significant Imaging:  Labs: Reviewed  X-Ray: Reviewed  CT: Reviewed    Assessment/Plan:     ESRD (end stage renal disease) on dialysis  Overall patient is chronically debilitated with chronic right-sided pleural effusion with chronic dementia and mental status changes as documented in the to [facility] records.  Patient has right upper arm fistula for access.  Patient has left forehead contusion which is also chronic.  We will hold off on hemodialysis today as the patient is going to have therapeutic thoracentesis on the right side.  Will schedule for hemodialysis tomorrow.        Thank you for your consult.     Naya Edmonds MD   Nephrology  [facility]

## 2020-01-10 NOTE — ASSESSMENT & PLAN NOTE
-Likely due to renal dysfunction and heart failure. ACS unlikely.   -Trend troponin.   -Continue metoprolol and statin.   -Hold ASA for thoracentesis and resume when appropriate.

## 2020-01-10 NOTE — H&P (VIEW-ONLY)
[facility] -   Nephrology  Consult Note        Patient Name: Howie Rodgers  MRN: [id number]  Admission Date: 1/9/2020  Hospital Length of Stay: 0 days  Attending Provider: Yvon Moore, *   Primary Care Physician: Magdi Julio MD  Principal Problem:<principal problem not specified>    Consults  Subjective:     HPI: Patient is an 85-year-old male with history of ESRD on hemodialysis under care of [facility] Dr[facility] Bernard Garcia at [facility].  Has right upper arm fistula for access.  Patient has extensive atherosclerosis and has had history of falls with severe osteoporosis.  Patient comes in with some shortness of breath and right-sided pleural effusion needing thoracentesis.  Nephrology consultation is requested for provision of hemodialysis while inpatient.  Patient seen and examined at the bedside.  Patient has some chronic confusional state/dementia and his history is not completely reliable.  Majority of the information is obtained from [facility] as well as from records    Past Medical History:   Diagnosis Date    Acute on chronic combined systolic and diastolic congestive heart failure     Anemia     BPH (benign prostatic hyperplasia)     CAD (coronary artery disease)     COPD (chronic obstructive pulmonary disease)     CVA (cerebral vascular accident)     ESRD on dialysis     GERD (gastroesophageal reflux disease)     History of DVT (deep vein thrombosis)     Hyperlipemia     Hypertension        History reviewed. No pertinent surgical history.    Review of patient's allergies indicates:  No Known Allergies  No current facility-administered medications for this encounter.      Family History     None        Tobacco Use    Smoking status: Unknown If Ever Smoked    Smokeless tobacco: Never Used   Substance and Sexual Activity    Alcohol use: Never     Frequency: Never    Drug use: Never    Sexual activity: Not Currently     Review of Systems    Unable to perform ROS: Dementia   Constitutional: Positive for activity change and appetite change. Negative for chills, diaphoresis, fatigue and fever.   HENT: Positive for congestion. Negative for trouble swallowing.    Eyes: Negative.    Respiratory: Positive for cough and shortness of breath. Negative for chest tightness and wheezing.    Cardiovascular: Negative.  Negative for chest pain, palpitations and leg swelling.   Gastrointestinal: Negative.  Negative for abdominal distention, abdominal pain, nausea and vomiting.   Genitourinary: Negative.  Negative for decreased urine volume, difficulty urinating, dysuria, enuresis, flank pain, frequency, hematuria, penile swelling, scrotal swelling and urgency.   Musculoskeletal: Negative.  Negative for arthralgias, back pain, joint swelling and neck pain.   Skin: Negative for rash.   Neurological: Positive for dizziness, speech difficulty and weakness. Negative for tremors, seizures and headaches.   Psychiatric/Behavioral: Negative.  Negative for confusion and sleep disturbance. The patient is not nervous/anxious.    All other systems reviewed and are negative.    Objective:     Vital Signs (Most Recent):  Temp: 98.5 °F (36.9 °C) (01/10/20 1123)  Pulse: 79 (01/10/20 1123)  Resp: 18 (01/10/20 1123)  BP: (!) 152/67 (01/10/20 1123)  SpO2: 95 % (01/10/20 1123)  O2 Device (Oxygen Therapy): room air (01/10/20 0930) Vital Signs (24h Range):  Temp:  [98 °F (36.7 °C)-98.6 °F (37 °C)] 98.5 °F (36.9 °C)  Pulse:  [72-79] 79  Resp:  [18-31] 18  SpO2:  [91 %-98 %] 95 %  BP: (101-152)/(49-68) 152/67     Weight: 59.4 kg (131 lb) (01/09/20 1938)  Body mass index is 19.92 kg/m².  Body surface area is 1.69 meters squared.    No intake/output data recorded.    Physical Exam   Constitutional: He appears well-developed. He appears lethargic. No distress.   HENT:   Head: Normocephalic.   Left frontal bruising which is chronic/older   Eyes: Pupils are equal, round, and reactive to light.  EOM are normal.   Neck: Normal range of motion. Neck supple. No JVD present. No thyromegaly present.   Cardiovascular: Normal rate, regular rhythm, S1 normal, S2 normal and intact distal pulses. PMI is displaced. Exam reveals no gallop and no friction rub.   Murmur heard.   Crescendo systolic murmur is present with a grade of 3/6.   Diastolic murmur is present with a grade of 2/6.  Pulses:       Carotid pulses are 1+ on the right side, and 1+ on the left side.       Radial pulses are 1+ on the right side, and 1+ on the left side.        Femoral pulses are 1+ on the right side, and 1+ on the left side.       Popliteal pulses are 1+ on the right side, and 1+ on the left side.        Dorsalis pedis pulses are 1+ on the right side, and 1+ on the left side.        Posterior tibial pulses are 1+ on the right side, and 1+ on the left side.   Pulmonary/Chest: Effort normal. No respiratory distress. He has decreased breath sounds in the right upper field, the right middle field, the right lower field and the left lower field. He has wheezes. He has rhonchi. He has rales. He exhibits no tenderness.   Abdominal: He exhibits no distension and no mass. There is no hepatosplenomegaly. There is no tenderness. There is no rebound and no CVA tenderness. No hernia.   Musculoskeletal: Normal range of motion. He exhibits no edema or tenderness.   Severe muscle wasting.  Severe arthritis with bony abnormalities.  Patient laying in the right side.    Right upper arm fistula positive for bruit and thrill   Lymphadenopathy:     He has no cervical adenopathy.   Neurological: He appears lethargic. He is not disoriented. He displays abnormal reflex. No cranial nerve deficit. He exhibits normal muscle tone. Coordination normal.   Skin: Skin is warm and dry. No rash noted. No erythema.   Psychiatric: He has a normal mood and affect. His behavior is normal. Judgment and thought content normal.       Significant Labs:  All labs within the past 24  hours have been reviewed.    Significant Imaging:  Labs: Reviewed  X-Ray: Reviewed  CT: Reviewed    Assessment/Plan:     ESRD (end stage renal disease) on dialysis  Overall patient is chronically debilitated with chronic right-sided pleural effusion with chronic dementia and mental status changes as documented in the to [facility] records.  Patient has right upper arm fistula for access.  Patient has left forehead contusion which is also chronic.  We will hold off on hemodialysis today as the patient is going to have therapeutic thoracentesis on the right side.  Will schedule for hemodialysis tomorrow.        Thank you for your consult.     Naya Edmonds MD   Nephrology  [facility]

## 2020-01-10 NOTE — ED PROVIDER NOTES
Encounter Date: 1/9/2020       History     Chief Complaint   Patient presents with    Fall     arrived via [facility] c/o witnessed fall out of wheel chair no loc     Patient is an 85-year-old male who presents from nursing home after a witnessed fall.  Patient was reportedly in his wheelchair when he fell forward and hit his head.  Patient has had multiple falls recently.  No loss of consciousness today.  History provided by ambulance personnel.  Patient unable to provide any reliable history due to baseline mental status.        Review of patient's allergies indicates:  No Known Allergies  Past Medical History:   Diagnosis Date    Anemia     BPH (benign prostatic hyperplasia)     CHF (congestive heart failure)     CVA (cerebral vascular accident)     Hyperlipemia     Hypertension     Renal disorder      History reviewed. No pertinent surgical history.  History reviewed. No pertinent family history.  Social History     Tobacco Use    Smoking status: Unknown If Ever Smoked    Smokeless tobacco: Never Used   Substance Use Topics    Alcohol use: Never     Frequency: Never    Drug use: Never     Review of Systems     UTO 2/2 baseline mental status    Physical Exam     Initial Vitals [01/09/20 1938]   BP Pulse Resp Temp SpO2   (!) 121/58 72 20 98.6 °F (37 °C) 98 %      MAP       --         Physical Exam     Constitutional: no acute distress  HENT: normocephalic, no facial bone tenderness, no evidence of basilar skull fx  Eyes: PERRL, EOM, normal conjunctiva  Neck: Trachea midline, nontender, full ROM  Cardiovascular: RRR, 2+ palpable pulses in all 4 extremities  Pulmonary: Non-labored respirations, equal bilateral breath sounds, LCTAB  Chest Wall: No tenderness, no deformity, midline incisional scar  Abdominal: Soft, nontender, nondistended  Back: Nontender, no step-offs  Musculoskeletal: extremity contractures  Neurological: awake, baseline mental status, maintaining airway, old neuro deficits  Skin:  Large area  of left flank ecchymosis      ED Course   Procedures  Labs Reviewed   COMPREHENSIVE METABOLIC PANEL - Abnormal; Notable for the following components:       Result Value    Potassium 3.1 (*)     Glucose 205 (*)     BUN, Bld 27 (*)     Creatinine 2.8 (*)     Albumin 3.2 (*)     Total Bilirubin 1.2 (*)     eGFR if  22.7 (*)     eGFR if non  19.7 (*)     All other components within normal limits   CBC W/ AUTO DIFFERENTIAL - Abnormal; Notable for the following components:    WBC 13.59 (*)     RBC 4.18 (*)     Hemoglobin 11.4 (*)     Hematocrit 37.5 (*)     Mean Corpuscular Hemoglobin Conc 30.4 (*)     RDW 17.6 (*)     Platelets 368 (*)     Immature Granulocytes 0.6 (*)     Gran # (ANC) 11.8 (*)     Immature Grans (Abs) 0.08 (*)     Lymph # 0.5 (*)     Mono # 1.1 (*)     Gran% 86.9 (*)     Lymph% 3.8 (*)     All other components within normal limits   B-TYPE NATRIURETIC PEPTIDE - Abnormal; Notable for the following components:    BNP >4,900 (*)     All other components within normal limits   TROPONIN I - Abnormal; Notable for the following components:    Troponin I 0.180 (*)     All other components within normal limits   APTT   PROTIME-INR   LIPASE   TROPONIN I   B-TYPE NATRIURETIC PEPTIDE     Results for orders placed or performed during the hospital encounter of 01/09/20   Comprehensive metabolic panel   Result Value Ref Range    Sodium 142 136 - 145 mmol/L    Potassium 3.1 (L) 3.5 - 5.1 mmol/L    Chloride 99 95 - 110 mmol/L    CO2 29 23 - 29 mmol/L    Glucose 205 (H) 70 - 110 mg/dL    BUN, Bld 27 (H) 8 - 23 mg/dL    Creatinine 2.8 (H) 0.5 - 1.4 mg/dL    Calcium 10.0 8.7 - 10.5 mg/dL    Total Protein 7.5 6.0 - 8.4 g/dL    Albumin 3.2 (L) 3.5 - 5.2 g/dL    Total Bilirubin 1.2 (H) 0.1 - 1.0 mg/dL    Alkaline Phosphatase 101 55 - 135 U/L    AST 40 10 - 40 U/L    ALT 25 10 - 44 U/L    Anion Gap 14 8 - 16 mmol/L    eGFR if African American 22.7 (A) >60 mL/min/1.73 m^2    eGFR if non African  American 19.7 (A) >60 mL/min/1.73 m^2   CBC auto differential   Result Value Ref Range    WBC 13.59 (H) 3.90 - 12.70 K/uL    RBC 4.18 (L) 4.60 - 6.20 M/uL    Hemoglobin 11.4 (L) 14.0 - 18.0 g/dL    Hematocrit 37.5 (L) 40.0 - 54.0 %    Mean Corpuscular Volume 90 82 - 98 fL    Mean Corpuscular Hemoglobin 27.3 27.0 - 31.0 pg    Mean Corpuscular Hemoglobin Conc 30.4 (L) 32.0 - 36.0 g/dL    RDW 17.6 (H) 11.5 - 14.5 %    Platelets 368 (H) 150 - 350 K/uL    MPV 10.0 9.2 - 12.9 fL    Immature Granulocytes 0.6 (H) 0.0 - 0.5 %    Gran # (ANC) 11.8 (H) 1.8 - 7.7 K/uL    Immature Grans (Abs) 0.08 (H) 0.00 - 0.04 K/uL    Lymph # 0.5 (L) 1.0 - 4.8 K/uL    Mono # 1.1 (H) 0.3 - 1.0 K/uL    Eos # 0.0 0.0 - 0.5 K/uL    Baso # 0.01 0.00 - 0.20 K/uL    nRBC 0 0 /100 WBC    Gran% 86.9 (H) 38.0 - 73.0 %    Lymph% 3.8 (L) 18.0 - 48.0 %    Mono% 8.4 4.0 - 15.0 %    Eosinophil% 0.2 0.0 - 8.0 %    Basophil% 0.1 0.0 - 1.9 %    Differential Method Automated    APTT   Result Value Ref Range    aPTT 29.2 21.0 - 32.0 sec   Protime-INR   Result Value Ref Range    Prothrombin Time 11.1 9.0 - 12.5 sec    INR 1.1 0.8 - 1.2   Lipase   Result Value Ref Range    Lipase 22 4 - 60 U/L   Brain natriuretic peptide   Result Value Ref Range    BNP >4,900 (H) 0 - 99 pg/mL   Troponin I   Result Value Ref Range    Troponin I 0.180 (H) 0.000 - 0.026 ng/mL            Imaging Results          CT Chest Abdoment Pelvis Without Contrast (XPD) (Final result)  Result time 01/09/20 21:06:29    Final result by Rafita Will MD (01/09/20 21:06:29)                 Impression:      Limited study due to difficulty in positioning the patient.    Postop changes of the chest with sternal wires.  Coronary artery and valvular calcification.    Large right pleural effusion with partial collapse of the right lower lobe.    Left pectoral enlargement compatible with pectoral hematoma.    Thoracolumbar scoliosis with degenerative disc disease and postop changes.    Normal  appendix.    Cholelithiasis.    Aortic atherosclerosis.    Low-grade rectal fecal impaction.      Electronically signed by: Rafita Will MD  Date:    01/09/2020  Time:    21:06             Narrative:    EXAMINATION:  CT CHEST ABDOMEN PELVIS WITHOUT CONTRAST(XPD)    CLINICAL HISTORY:  Trauma multiple sites.  Chest pain.  Abdominal pain.    TECHNIQUE:  Standard CT technique.  All CT scans at this facility are performed  using dose modulation techniques as appropriate to performed exam including the following:  automated exposure control; adjustment of mA and/or kV according to the patients size (this includes techniques or standardized protocols for targeted exams where dose is matched to indication/reason for exam: i.e. extremities or head);  iterative reconstruction technique.    COMPARISON:  None    FINDINGS:  CT chest without: The left lung reveals dependent atelectasis.  Left pectoral/chest wall mass muscle enlargement, usually related to chest wall hematoma.  Please correlate with clinical exam.    The right lung reveals a very large right pleural effusion.  There is partial collapse of the right lower lobe.    Sternal wires and mediastinal clips are present.  The heart size is enlarged.  Valvular and coronary artery calcification is noted.    There is thoracolumbar spondylosis.  Advanced degenerative disc disease is present.  There is fusion of several thoracic in several lumbar disc spaces with advanced degenerative disc disease.  Postop changes are evident at the thoracolumbar spine junction with dorsal decompression.  Please correlate with surgical history.    CT of the abdomen and pelvis without: The liver and spleen are nonenlarged.  The gallbladder is present with several calcified gallstones.    The kidneys are small with several renal cysts.  Abdominal aortic atherosclerosis is present.  Iliac vascular calcification is noted.    The small bowel loops are nondilated.  The appendix is  normal.    Moderate stool is present with a low-grade rectal fecal impaction.    Limited assessment of the pelvis and left hip region due to patient positioning.    The urinary bladder is nondistended.                               CT Cervical Spine Without Contrast (Final result)  Result time 01/09/20 20:56:33    Final result by Rafita Will MD (01/09/20 20:56:33)                 Impression:      Stable CT of the cervical spine with severe degenerative disc disease and stable C3-4 and C5-6 spondylolisthesis.  No acute findings.    All CT scans at this facility use dose modulation, iterative reconstruction and/or weight based dosing when appropriate to reduce radiation dose to as low as reasonably achievable.      Electronically signed by: Rafita Will MD  Date:    01/09/2020  Time:    20:56             Narrative:    EXAMINATION:  CT CERVICAL SPINE WITHOUT CONTRAST    CLINICAL HISTORY:  Neck injury with neck pain after a fall.    TECHNIQUE:  Axial CT of the cervical spine with coronal and sagittal reformates.  Limited due to difficulty in positioning the patient.    All CT scans at this facility are performed  using dose modulation techniques as appropriate to performed exam including the following:  automated exposure control; adjustment of mA and/or kV according to the patients size (this includes techniques or standardized protocols for targeted exams where dose is matched to indication/reason for exam: i.e. extremities or head);  iterative reconstruction technique.    COMPARISON:  04/16/2019.    FINDINGS:  Prevertebral soft tissues appear normal.    C3-4 and C5-6 spondylolisthesis is again evident.    There is advanced C3-4 degenerative disc disease.  Fusion of the C4-5 disc space is present.  Moderate C5-6 degenerative disc disease is present.  Advanced C6-7 degenerative disc disease is present.    Multilevel facet arthrosis is present.  The C1 ring appears intact at this time.  C1-2 arthrosis is  present.    Incidentally carotid artery calcification is noted.    No acute findings                               CT Head Without Contrast (Final result)  Result time 01/09/20 20:35:13    Final result by Rafita Will MD (01/09/20 20:35:13)                 Impression:      Small left frontal scalp hematoma.  No acute intracranial process.  Moderately advanced atrophy.      Electronically signed by: Rafita Will MD  Date:    01/09/2020  Time:    20:35             Narrative:    EXAMINATION:  CT HEAD WITHOUT CONTRAST    CLINICAL HISTORY:  Head injury with headache.    TECHNIQUE:  Standard noncontrast CT of the brain.    All CT scans at this facility are performed  using dose modulation techniques as appropriate to performed exam including the following:  automated exposure control; adjustment of mA and/or kV according to the patients size (this includes techniques or standardized protocols for targeted exams where dose is matched to indication/reason for exam: i.e. extremities or head);  iterative reconstruction technique.    COMPARISON:  None.    FINDINGS:  The ventricles are moderately to markedly enlarged.  Extra-axial CSF spaces are prominent as well.    No acute hemorrhage.    Intracranial vascular calcification.    Small left frontal scalp hematoma.    The skull is grossly normal.                              EKG reviewed interpreted by ED provider:  Artifact limits interpretation.  Artifact limits interpretation of rhythm.  Rate 74.  Right bundle-branch block pattern with wide QRS.  No evidence of STEMI.         Medical Decision Making:   Due to the history of recurrent falls and the large left flank ecchymosis; CT imaging obtained; CT revealed large right pleural effusion with collapse of the lung; pleural effusion does not appear to be entirely acute, but does appear to be enlarging; discussed with Hospital Medicine; patient will need admission for large right pleural effusion; we do not have  inpatient beds at this facility, and therefore patient will need to be transferred to [facility]; explain this to patient; unclear whether patient understands plan of care including the need for transfer due to his baseline mental status; Dr. Dawson is the accepting physician; [facility]  Troponin is elevated; patient is a dialysis patient and we have no prior troponin to compare it to; patient does take 162 mg of aspirin daily per outside record review; patient is hypokalemic; unclear when patient's last dialysis was; hypokalemia mild; will not give potassium to end-stage renal disease patient                                 Clinical Impression:   Final diagnoses:  [J90] Pleural effusion  [J90] Pleural effusion, right (Primary)  [W19.XXXA] Fall, initial encounter  [S00.83XA] Traumatic hematoma of forehead, initial encounter  [R58] Ecchymosis  [E87.6] Hypokalemia  [N18.6] End stage renal disease      Disposition:   Disposition: Placed in Observation  Condition: Stable                     Tone Vargas MD  01/10/20 0238       Tone Vargas MD  01/10/20 0238

## 2020-01-10 NOTE — H&P
[facility] Hospital Medicine  History & Physical    Patient Name: Howie Rodgers  MRN: [id number]  Admission Date: 1/9/2020  Attending Physician: Yvon Moore, *   Primary Care Provider: Magdi Julio MD         Patient information was obtained from patient, past medical records and ER records.     Subjective:     Principal Problem:Pleural effusion, right    Chief Complaint:   Chief Complaint   Patient presents with    Fall     arrived via aasi c/o witnessed fall out of wheel chair no loc        HPI: Howie Rodgers is an 85 year old male with combined heart failure, ESRD on dialysis Mondays, Wednesdays and Fridays who resides at [facility] and presented to the [facility] ED with reports of a fall out of his wheelchair prior to presentation. The patient was then found to have an increase in the size of his right sided pleural effusion. He cannot provide any history due to a baseline altered mental status. Of note, the patient was seen in the [facility] ED on 12/31/19 and 1/2/20 due to similar falls. Labs in the ED are significant for a potassium of 3.1, creatinine of 2.8, troponin of 0.18 and BNP of > 4,900. CT scans of his head, cervical spine, chest, abdomen and pelvis were essential negative with the exception of a worsening right pleural effusion and a left pectoral hematoma and low grade rectal fecal impaction. Per the patient's LaPost, he is a full code. Ally Ward is his POA.     Past Medical History:   Diagnosis Date    Acute on chronic combined systolic and diastolic congestive heart failure     Anemia     BPH (benign prostatic hyperplasia)     CAD (coronary artery disease)     COPD (chronic obstructive pulmonary disease)     CVA (cerebral vascular accident)     ESRD on dialysis     GERD (gastroesophageal reflux disease)     History of DVT (deep vein thrombosis)     Hyperlipemia     Hypertension        History reviewed. No pertinent surgical  history.    Review of patient's allergies indicates:  No Known Allergies    No current facility-administered medications on file prior to encounter.      Current Outpatient Medications on File Prior to Encounter   Medication Sig    amLODIPine (NORVASC) 2.5 MG tablet Take 2.5 mg by mouth 2 (two) times daily.    aspirin 81 MG Chew Take 162 mg by mouth 2 (two) times daily.    escitalopram oxalate (LEXAPRO) 10 MG tablet Take 10 mg by mouth every evening.     folic acid (FOLVITE) 1 MG tablet Take 1 mg by mouth once daily.    folic acid/vit B complex and C (RENAL VITAMIN ORAL) Take 1 tablet by mouth once daily.    metoprolol tartrate (LOPRESSOR) 50 MG tablet Take 50 mg by mouth 2 (two) times daily.    pantoprazole (PROTONIX) 40 MG tablet Take 40 mg by mouth once daily.     protein supplement (PROMOD PROTEIN ORAL) Take 30 mLs by mouth once daily.    sevelamer carbonate (RENVELA ORAL) Take 800 mg by mouth 3 (three) times daily before meals.     tamsulosin (FLOMAX) 0.4 mg Cap Take 0.4 mg by mouth once daily.    acetaminophen (TYLENOL) 325 MG tablet Take 1 tablet (325 mg total) by mouth every 6 (six) hours as needed for Pain.    chlorpheniramine/dextromethorp (ROBITUSSIN COUGH & COLD ORAL) Take by mouth.    HYDROcodone-acetaminophen (NORCO)  mg per tablet Take 1 tablet by mouth every 4 (four) hours as needed for Pain.     Family History     Reviewed and not pertinent.         Tobacco Use    Smoking status: Unknown If Ever Smoked    Smokeless tobacco: Never Used   Substance and Sexual Activity    Alcohol use: Never     Frequency: Never    Drug use: Never    Sexual activity: Not Currently     Review of Systems   Unable to perform ROS: Dementia     Objective:     Vital Signs (Most Recent):  Temp: 98.5 °F (36.9 °C) (01/10/20 1123)  Pulse: 79 (01/10/20 1123)  Resp: 18 (01/10/20 1123)  BP: (!) 152/67 (01/10/20 1123)  SpO2: 95 % (01/10/20 1123) Vital Signs (24h Range):  Temp:  [98 °F (36.7 °C)-98.6 °F (37  °C)] 98.5 °F (36.9 °C)  Pulse:  [72-79] 79  Resp:  [18-31] 18  SpO2:  [91 %-98 %] 95 %  BP: (101-152)/(49-68) 152/67     Weight: 59.4 kg (131 lb)  Body mass index is 19.92 kg/m².    Physical Exam   Constitutional: He appears well-developed and well-nourished.   HENT:   Head: Normocephalic and atraumatic.   Eyes: Conjunctivae are normal.   Neck: Neck supple. No JVD present.   Cardiovascular: Normal rate, regular rhythm and normal heart sounds.   Pulmonary/Chest: No tachypnea. He is in respiratory distress. He has decreased breath sounds in the right lower field. He has wheezes (frequent).   Abdominal: Soft. Bowel sounds are normal. He exhibits no distension. There is no tenderness.   Musculoskeletal:   Contractures to bilateral upper and lower extremities.    Neurological: He is alert.   Disoriented to time, situation and place.    Skin: Skin is warm and dry. No rash noted.   Diffuse abrasions at various stages of healing and brusing to upper and lower extremities as well as chest.    Psychiatric: He has a normal mood and affect. His behavior is normal. Thought content normal.   Nursing note and vitals reviewed.          Significant Labs:   CBC:   Recent Labs   Lab 01/09/20 1957   WBC 13.59*   HGB 11.4*   HCT 37.5*   *     CMP:   Recent Labs   Lab 01/09/20 1957      K 3.1*   CL 99   CO2 29   *   BUN 27*   CREATININE 2.8*   CALCIUM 10.0   PROT 7.5   ALBUMIN 3.2*   BILITOT 1.2*   ALKPHOS 101   AST 40   ALT 25   ANIONGAP 14   EGFRNONAA 19.7*     Troponin:   Recent Labs   Lab 01/09/20 1957   TROPONINI 0.180*     All pertinent labs within the past 24 hours have been reviewed.    Significant Imaging: I have reviewed all pertinent imaging results/findings within the past 24 hours.   Imaging Results          CT Chest Abdoment Pelvis Without Contrast (XPD) (Final result)  Result time 01/09/20 21:06:29    Final result by Rafita Will MD (01/09/20 21:06:29)                 Impression:      Limited  study due to difficulty in positioning the patient.    Postop changes of the chest with sternal wires.  Coronary artery and valvular calcification.    Large right pleural effusion with partial collapse of the right lower lobe.    Left pectoral enlargement compatible with pectoral hematoma.    Thoracolumbar scoliosis with degenerative disc disease and postop changes.    Normal appendix.    Cholelithiasis.    Aortic atherosclerosis.    Low-grade rectal fecal impaction.      Electronically signed by: Rafita Will MD  Date:    01/09/2020  Time:    21:06             Narrative:    EXAMINATION:  CT CHEST ABDOMEN PELVIS WITHOUT CONTRAST(XPD)    CLINICAL HISTORY:  Trauma multiple sites.  Chest pain.  Abdominal pain.    TECHNIQUE:  Standard CT technique.  All CT scans at this facility are performed  using dose modulation techniques as appropriate to performed exam including the following:  automated exposure control; adjustment of mA and/or kV according to the patients size (this includes techniques or standardized protocols for targeted exams where dose is matched to indication/reason for exam: i.e. extremities or head);  iterative reconstruction technique.    COMPARISON:  None    FINDINGS:  CT chest without: The left lung reveals dependent atelectasis.  Left pectoral/chest wall mass muscle enlargement, usually related to chest wall hematoma.  Please correlate with clinical exam.    The right lung reveals a very large right pleural effusion.  There is partial collapse of the right lower lobe.    Sternal wires and mediastinal clips are present.  The heart size is enlarged.  Valvular and coronary artery calcification is noted.    There is thoracolumbar spondylosis.  Advanced degenerative disc disease is present.  There is fusion of several thoracic in several lumbar disc spaces with advanced degenerative disc disease.  Postop changes are evident at the thoracolumbar spine junction with dorsal decompression.  Please  correlate with surgical history.    CT of the abdomen and pelvis without: The liver and spleen are nonenlarged.  The gallbladder is present with several calcified gallstones.    The kidneys are small with several renal cysts.  Abdominal aortic atherosclerosis is present.  Iliac vascular calcification is noted.    The small bowel loops are nondilated.  The appendix is normal.    Moderate stool is present with a low-grade rectal fecal impaction.    Limited assessment of the pelvis and left hip region due to patient positioning.    The urinary bladder is nondistended.                               CT Cervical Spine Without Contrast (Final result)  Result time 01/09/20 20:56:33    Final result by Rafita Will MD (01/09/20 20:56:33)                 Impression:      Stable CT of the cervical spine with severe degenerative disc disease and stable C3-4 and C5-6 spondylolisthesis.  No acute findings.    All CT scans at this facility use dose modulation, iterative reconstruction and/or weight based dosing when appropriate to reduce radiation dose to as low as reasonably achievable.      Electronically signed by: Rafita Will MD  Date:    01/09/2020  Time:    20:56             Narrative:    EXAMINATION:  CT CERVICAL SPINE WITHOUT CONTRAST    CLINICAL HISTORY:  Neck injury with neck pain after a fall.    TECHNIQUE:  Axial CT of the cervical spine with coronal and sagittal reformates.  Limited due to difficulty in positioning the patient.    All CT scans at this facility are performed  using dose modulation techniques as appropriate to performed exam including the following:  automated exposure control; adjustment of mA and/or kV according to the patients size (this includes techniques or standardized protocols for targeted exams where dose is matched to indication/reason for exam: i.e. extremities or head);  iterative reconstruction technique.    COMPARISON:  04/16/2019.    FINDINGS:  Prevertebral soft tissues  appear normal.    C3-4 and C5-6 spondylolisthesis is again evident.    There is advanced C3-4 degenerative disc disease.  Fusion of the C4-5 disc space is present.  Moderate C5-6 degenerative disc disease is present.  Advanced C6-7 degenerative disc disease is present.    Multilevel facet arthrosis is present.  The C1 ring appears intact at this time.  C1-2 arthrosis is present.    Incidentally carotid artery calcification is noted.    No acute findings                               CT Head Without Contrast (Final result)  Result time 01/09/20 20:35:13    Final result by Rafita Will MD (01/09/20 20:35:13)                 Impression:      Small left frontal scalp hematoma.  No acute intracranial process.  Moderately advanced atrophy.      Electronically signed by: Rafita Will MD  Date:    01/09/2020  Time:    20:35             Narrative:    EXAMINATION:  CT HEAD WITHOUT CONTRAST    CLINICAL HISTORY:  Head injury with headache.    TECHNIQUE:  Standard noncontrast CT of the brain.    All CT scans at this facility are performed  using dose modulation techniques as appropriate to performed exam including the following:  automated exposure control; adjustment of mA and/or kV according to the patients size (this includes techniques or standardized protocols for targeted exams where dose is matched to indication/reason for exam: i.e. extremities or head);  iterative reconstruction technique.    COMPARISON:  None.    FINDINGS:  The ventricles are moderately to markedly enlarged.  Extra-axial CSF spaces are prominent as well.    No acute hemorrhage.    Intracranial vascular calcification.    Small left frontal scalp hematoma.    The skull is grossly normal.                                  Assessment/Plan:     * Pleural effusion, right  -Plans for IR to perform diagnostic and therapeutic thoracentesis on 1/10/20.   -Continue fluid removal with dialysis per Nephrology.         Acute on chronic combined systolic  and diastolic heart failure  -Fluid management per Nephrology.   -Monitor volume status.   -Continue metoprolol.       Elevated troponin  -Likely due to renal dysfunction and heart failure. ACS unlikely.   -Trend troponin.   -Continue metoprolol and statin.   -Hold ASA for thoracentesis and resume when appropriate.       ESRD (end stage renal disease) on dialysis  -Dialysis per Nephrology.   -Continue Renvela.       Hyperlipemia  Continue statin.       Hypertension  -Stable.   -Continue metoprolol and Norvasc.        COPD (chronic obstructive pulmonary disease)  -Wheezing noted on exam.   -Duonebs and oxygen therapy as needed.       Debility  -Frequent turning.   -Supportive care.         VTE Risk Mitigation (From admission, onward)         Ordered     Place sequential compression device  Until discontinued      01/10/20 6346                   MONY Altamirano  Department of Hospital Medicine   [facility]

## 2020-01-10 NOTE — CONSULTS
Chart reviewed and patient appears to be a candidate for a thoracentesis.  The order has been placed and the procedure will be performed asap.  Thank you for the consult.

## 2020-01-10 NOTE — INTERVAL H&P NOTE
The patient has been examined and the H&P has been reviewed:    I concur with the findings and no changes have occurred since H&P was written.        Active Hospital Problems    Diagnosis  POA    *Pleural effusion, right [J90]  Yes    Acute on chronic combined systolic and diastolic heart failure [I50.43]  Yes    Elevated troponin [R79.89]  Yes    COPD (chronic obstructive pulmonary disease) [J44.9]  Yes    Hypertension [I10]  Yes    Hyperlipemia [E78.5]  Yes    ESRD (end stage renal disease) on dialysis [N18.6, Z99.2]  Not Applicable     Last Assessment & Plan:   History & Physical He has a chronic history of end-stage renal disease and is on hemodialysis Monday Wednesday Friday at his nursing home.  He did receive IV contrast here and I recommend proceeding with hemodialysis tomorrow as scheduled.    Discharge Summary     Follow-up      Debility [R53.81]  Yes     Last Assessment & Plan:   History & Physical He has chronic debility with apparent wheelchair-bound status.  Would recommend continuing this and likely needs to be better secured to his wheelchair when he is being transported.    Discharge Summary     Follow-up        Resolved Hospital Problems   No resolved problems to display.

## 2020-01-10 NOTE — HPI
Patient is an 85-year-old male with history of ESRD on hemodialysis [facility] Dr. Bernard Garcia at [facility].  Has right upper arm fistula for access.  Patient has extensive atherosclerosis and has had history of falls with severe osteoporosis.  Patient comes in with some shortness of breath and right-sided pleural effusion needing thoracentesis.  Nephrology consultation is requested for provision of hemodialysis while inpatient.  Patient seen and examined at the bedside.  Patient has some chronic confusional state/dementia and his history is not completely reliable.  Majority of the information is obtained from [facility] as well as from records

## 2020-01-10 NOTE — ASSESSMENT & PLAN NOTE
Overall patient is chronically debilitated with chronic right-sided pleural effusion with chronic dementia and mental status changes as documented in the to [facility] records.  Patient has right upper arm fistula for access.  Patient has left forehead contusion which is also chronic.  We will hold off on hemodialysis today as the patient is going to have therapeutic thoracentesis on the right side.  Will schedule for hemodialysis tomorrow.

## 2020-01-10 NOTE — HPI
Howie Rodgers is an 85 year old male with combined heart failure, ESRD on dialysis Mondays, Wednesdays and Fridays who resides at [facility] and presented to the [facility] ED with reports of a fall out of his wheelchair prior to presentation. The patient was then found to have an increase in the size of his right sided pleural effusion. He cannot provide any history due to a baseline altered mental status. Of note, the patient was seen in the [facility] ED on 12/31/19 and 1/2/20 due to similar falls. Labs in the ED are significant for a potassium of 3.1, creatinine of 2.8, troponin of 0.18 and BNP of > 4,900. CT scans of his head, cervical spine, chest, abdomen and pelvis were essential negative with the exception of a worsening right pleural effusion and a left pectoral hematoma and low grade rectal fecal impaction. Per the patient's LaPost, he is a full code. Ally Ward is his POA.

## 2020-01-10 NOTE — PLAN OF CARE
JAMES attempted to meet with the patient at bedside to assess for discharge planning.  Patient was not oriented to time and place, but was alert.  Patient was not a good historian and could not answer any of the questions presented by JAMES.  JAMES attempted to make contact with the patient's daughter but she could not be reached, therefore JAMES left a voicemail message requesting a return phone call.  JAMES then reached out to another emergency contact that disclosed to JAMES that the patient is currently a resident at [facility] ().  JAMES made contact with April, the patient's nurse at [facility] to complete discharge assessment for the patient.  Patient's IO Nurse stated that the patient currently receives dialysis on MWF.  She also reported that the patient is wheel chair bound.  The patient will be returning to [facility] upon discharge.  JAMES provided a transitional care folder, information on advanced directives, information on pharmacy bedside delivery, and discharge planning begins on admission with contact information for any needs/questions.     D/C Plan:  Home  PCP:  Dr. Magdi Julio  Preferred Pharmacy:  [facility]  Discharge transportation:  [facility]  [facility]:  Active  Pharmacy Bedside Delivery: Declined       01/10/20 1228   Discharge Assessment   Assessment Type Discharge Planning Assessment   Confirmed/corrected address and phone number on facesheet? Yes   Assessment information obtained from? Patient   Expected Length of Stay (days)   (TBD)   Communicated expected length of stay with patient/caregiver yes   Prior to hospitilization cognitive status: Alert/Oriented  (Patient resided at [facility])   Prior to hospitalization functional status: Wheelchair Bound   Current cognitive status: Not Oriented to Person;Not Oriented to Place;Not Oriented to Time   Current Functional Status: Wheelchair Bound   Facility Arrived From: [facility]   Lives With facility resident   Able to Return to Prior Arrangements yes   Is patient able to care for self after discharge? No   Who are your caregiver(s) and their phone number(s)? Ally Ward (saughter) [phone]  and Ivan Rosado (relative) [phone] or    Patient's perception of discharge disposition home or selfcare   Readmission Within the Last 30 Days no previous admission in last 30 days   Patient currently being followed by outpatient case management? No   Patient currently receives any other outside agency services? No   Equipment Currently Used at Home none   Do you have any problems affording any of your prescribed medications? No   Is the patient taking medications as prescribed? yes   Does the patient have transportation home? No   Dialysis Name and Scheduled days N/A   Does the patient receive services at [facility]? No   Discharge Plan A Return to nursing home   DME Needed Upon Discharge  none   Patient/Family in Agreement with Plan yes

## 2020-01-10 NOTE — SUBJECTIVE & OBJECTIVE
Past Medical History:   Diagnosis Date    Acute on chronic combined systolic and diastolic congestive heart failure     Anemia     BPH (benign prostatic hyperplasia)     CAD (coronary artery disease)     COPD (chronic obstructive pulmonary disease)     CVA (cerebral vascular accident)     ESRD on dialysis     GERD (gastroesophageal reflux disease)     History of DVT (deep vein thrombosis)     Hyperlipemia     Hypertension        History reviewed. No pertinent surgical history.    Review of patient's allergies indicates:  No Known Allergies    No current facility-administered medications on file prior to encounter.      Current Outpatient Medications on File Prior to Encounter   Medication Sig    amLODIPine (NORVASC) 2.5 MG tablet Take 2.5 mg by mouth 2 (two) times daily.    aspirin 81 MG Chew Take 162 mg by mouth 2 (two) times daily.    escitalopram oxalate (LEXAPRO) 10 MG tablet Take 10 mg by mouth every evening.     folic acid (FOLVITE) 1 MG tablet Take 1 mg by mouth once daily.    folic acid/vit B complex and C (RENAL VITAMIN ORAL) Take 1 tablet by mouth once daily.    metoprolol tartrate (LOPRESSOR) 50 MG tablet Take 50 mg by mouth 2 (two) times daily.    pantoprazole (PROTONIX) 40 MG tablet Take 40 mg by mouth once daily.     protein supplement (PROMOD PROTEIN ORAL) Take 30 mLs by mouth once daily.    sevelamer carbonate (RENVELA ORAL) Take 800 mg by mouth 3 (three) times daily before meals.     tamsulosin (FLOMAX) 0.4 mg Cap Take 0.4 mg by mouth once daily.    acetaminophen (TYLENOL) 325 MG tablet Take 1 tablet (325 mg total) by mouth every 6 (six) hours as needed for Pain.    chlorpheniramine/dextromethorp (ROBITUSSIN COUGH & COLD ORAL) Take by mouth.    HYDROcodone-acetaminophen (NORCO)  mg per tablet Take 1 tablet by mouth every 4 (four) hours as needed for Pain.     Family History     None        Tobacco Use    Smoking status: Unknown If Ever Smoked    Smokeless tobacco:  Never Used   Substance and Sexual Activity    Alcohol use: Never     Frequency: Never    Drug use: Never    Sexual activity: Not Currently     Review of Systems   Unable to perform ROS: Dementia     Objective:     Vital Signs (Most Recent):  Temp: 98.5 °F (36.9 °C) (01/10/20 1123)  Pulse: 79 (01/10/20 1123)  Resp: 18 (01/10/20 1123)  BP: (!) 152/67 (01/10/20 1123)  SpO2: 95 % (01/10/20 1123) Vital Signs (24h Range):  Temp:  [98 °F (36.7 °C)-98.6 °F (37 °C)] 98.5 °F (36.9 °C)  Pulse:  [72-79] 79  Resp:  [18-31] 18  SpO2:  [91 %-98 %] 95 %  BP: (101-152)/(49-68) 152/67     Weight: 59.4 kg (131 lb)  Body mass index is 19.92 kg/m².    Physical Exam   Constitutional: He appears well-developed and well-nourished.   HENT:   Head: Normocephalic and atraumatic.   Eyes: Conjunctivae are normal.   Neck: Neck supple. No JVD present.   Cardiovascular: Normal rate, regular rhythm and normal heart sounds.   Pulmonary/Chest: No tachypnea. He is in respiratory distress. He has decreased breath sounds in the right lower field. He has wheezes (frequent).   Abdominal: Soft. Bowel sounds are normal. He exhibits no distension. There is no tenderness.   Musculoskeletal:   Contractures to bilateral upper and lower extremities.    Neurological: He is alert.   Disoriented to time, situation and place.    Skin: Skin is warm and dry. No rash noted.   Diffuse abrasions at various stages of healing and brusing to upper and lower extremities as well as chest.    Psychiatric: He has a normal mood and affect. His behavior is normal. Thought content normal.   Nursing note and vitals reviewed.          Significant Labs:   CBC:   Recent Labs   Lab 01/09/20 1957   WBC 13.59*   HGB 11.4*   HCT 37.5*   *     CMP:   Recent Labs   Lab 01/09/20 1957      K 3.1*   CL 99   CO2 29   *   BUN 27*   CREATININE 2.8*   CALCIUM 10.0   PROT 7.5   ALBUMIN 3.2*   BILITOT 1.2*   ALKPHOS 101   AST 40   ALT 25   ANIONGAP 14   EGFRNONAA 19.7*      Troponin:   Recent Labs   Lab 01/09/20 1957   TROPONINI 0.180*     All pertinent labs within the past 24 hours have been reviewed.    Significant Imaging: I have reviewed all pertinent imaging results/findings within the past 24 hours.   Imaging Results          CT Chest Abdoment Pelvis Without Contrast (XPD) (Final result)  Result time 01/09/20 21:06:29    Final result by Rafita Will MD (01/09/20 21:06:29)                 Impression:      Limited study due to difficulty in positioning the patient.    Postop changes of the chest with sternal wires.  Coronary artery and valvular calcification.    Large right pleural effusion with partial collapse of the right lower lobe.    Left pectoral enlargement compatible with pectoral hematoma.    Thoracolumbar scoliosis with degenerative disc disease and postop changes.    Normal appendix.    Cholelithiasis.    Aortic atherosclerosis.    Low-grade rectal fecal impaction.      Electronically signed by: Rafita Will MD  Date:    01/09/2020  Time:    21:06             Narrative:    EXAMINATION:  CT CHEST ABDOMEN PELVIS WITHOUT CONTRAST(XPD)    CLINICAL HISTORY:  Trauma multiple sites.  Chest pain.  Abdominal pain.    TECHNIQUE:  Standard CT technique.  All CT scans at this facility are performed  using dose modulation techniques as appropriate to performed exam including the following:  automated exposure control; adjustment of mA and/or kV according to the patients size (this includes techniques or standardized protocols for targeted exams where dose is matched to indication/reason for exam: i.e. extremities or head);  iterative reconstruction technique.    COMPARISON:  None    FINDINGS:  CT chest without: The left lung reveals dependent atelectasis.  Left pectoral/chest wall mass muscle enlargement, usually related to chest wall hematoma.  Please correlate with clinical exam.    The right lung reveals a very large right pleural effusion.  There is partial  collapse of the right lower lobe.    Sternal wires and mediastinal clips are present.  The heart size is enlarged.  Valvular and coronary artery calcification is noted.    There is thoracolumbar spondylosis.  Advanced degenerative disc disease is present.  There is fusion of several thoracic in several lumbar disc spaces with advanced degenerative disc disease.  Postop changes are evident at the thoracolumbar spine junction with dorsal decompression.  Please correlate with surgical history.    CT of the abdomen and pelvis without: The liver and spleen are nonenlarged.  The gallbladder is present with several calcified gallstones.    The kidneys are small with several renal cysts.  Abdominal aortic atherosclerosis is present.  Iliac vascular calcification is noted.    The small bowel loops are nondilated.  The appendix is normal.    Moderate stool is present with a low-grade rectal fecal impaction.    Limited assessment of the pelvis and left hip region due to patient positioning.    The urinary bladder is nondistended.                               CT Cervical Spine Without Contrast (Final result)  Result time 01/09/20 20:56:33    Final result by Rafita Will MD (01/09/20 20:56:33)                 Impression:      Stable CT of the cervical spine with severe degenerative disc disease and stable C3-4 and C5-6 spondylolisthesis.  No acute findings.    All CT scans at this facility use dose modulation, iterative reconstruction and/or weight based dosing when appropriate to reduce radiation dose to as low as reasonably achievable.      Electronically signed by: Rafita Will MD  Date:    01/09/2020  Time:    20:56             Narrative:    EXAMINATION:  CT CERVICAL SPINE WITHOUT CONTRAST    CLINICAL HISTORY:  Neck injury with neck pain after a fall.    TECHNIQUE:  Axial CT of the cervical spine with coronal and sagittal reformates.  Limited due to difficulty in positioning the patient.    All CT scans at this  facility are performed  using dose modulation techniques as appropriate to performed exam including the following:  automated exposure control; adjustment of mA and/or kV according to the patients size (this includes techniques or standardized protocols for targeted exams where dose is matched to indication/reason for exam: i.e. extremities or head);  iterative reconstruction technique.    COMPARISON:  04/16/2019.    FINDINGS:  Prevertebral soft tissues appear normal.    C3-4 and C5-6 spondylolisthesis is again evident.    There is advanced C3-4 degenerative disc disease.  Fusion of the C4-5 disc space is present.  Moderate C5-6 degenerative disc disease is present.  Advanced C6-7 degenerative disc disease is present.    Multilevel facet arthrosis is present.  The C1 ring appears intact at this time.  C1-2 arthrosis is present.    Incidentally carotid artery calcification is noted.    No acute findings                               CT Head Without Contrast (Final result)  Result time 01/09/20 20:35:13    Final result by Rafita Will MD (01/09/20 20:35:13)                 Impression:      Small left frontal scalp hematoma.  No acute intracranial process.  Moderately advanced atrophy.      Electronically signed by: Rafita Will MD  Date:    01/09/2020  Time:    20:35             Narrative:    EXAMINATION:  CT HEAD WITHOUT CONTRAST    CLINICAL HISTORY:  Head injury with headache.    TECHNIQUE:  Standard noncontrast CT of the brain.    All CT scans at this facility are performed  using dose modulation techniques as appropriate to performed exam including the following:  automated exposure control; adjustment of mA and/or kV according to the patients size (this includes techniques or standardized protocols for targeted exams where dose is matched to indication/reason for exam: i.e. extremities or head);  iterative reconstruction technique.    COMPARISON:  None.    FINDINGS:  The ventricles are moderately to  markedly enlarged.  Extra-axial CSF spaces are prominent as well.    No acute hemorrhage.    Intracranial vascular calcification.    Small left frontal scalp hematoma.    The skull is grossly normal.

## 2020-01-10 NOTE — OP NOTE
Pre Op Diagnosis: right pleural effusion     Post Op Diagnosis: same     Procedure:  Right thoracentesis     Procedure performed by: Evangelina STEPHENS, Alvina HAYWOOD     Written Informed Consent Obtained: Yes     Specimen Removed:  yes     Estimated Blood Loss:  minimal     Findings: Local anesthesia and moderate sedation were used.     The patient tolerated the procedure well and there were no complications.      Sterile technique was performed in the lateral right thorax, lidocaine was used as a local anesthetic.  1300 ccc of pranay fluid removed and sent to lab for eval.  Pt tolerated the procedure well without immediate complications.  Please see radiologist report for details. F/u with PCP and/or ordering physician.

## 2020-01-11 VITALS
WEIGHT: 129.19 LBS | OXYGEN SATURATION: 96 % | TEMPERATURE: 98 F | BODY MASS INDEX: 19.58 KG/M2 | HEIGHT: 68 IN | SYSTOLIC BLOOD PRESSURE: 127 MMHG | DIASTOLIC BLOOD PRESSURE: 60 MMHG | HEART RATE: 86 BPM | RESPIRATION RATE: 18 BRPM

## 2020-01-11 PROBLEM — I50.43 ACUTE ON CHRONIC COMBINED SYSTOLIC AND DIASTOLIC HEART FAILURE: Status: RESOLVED | Noted: 2020-01-10 | Resolved: 2020-01-11

## 2020-01-11 PROBLEM — R79.89 ELEVATED TROPONIN: Status: RESOLVED | Noted: 2020-01-10 | Resolved: 2020-01-11

## 2020-01-11 LAB
ALBUMIN FLD-MCNC: 1.9 G/DL
ANION GAP SERPL CALC-SCNC: 16 MMOL/L (ref 8–16)
AORTIC VALVE STENOSIS: ABNORMAL
BASOPHILS # BLD AUTO: 0.01 K/UL (ref 0–0.2)
BASOPHILS NFR BLD: 0.1 % (ref 0–1.9)
BODY FLUID SOURCE, LDH: NORMAL
BUN SERPL-MCNC: 40 MG/DL (ref 8–23)
CALCIUM SERPL-MCNC: 9.7 MG/DL (ref 8.7–10.5)
CHLORIDE SERPL-SCNC: 102 MMOL/L (ref 95–110)
CO2 SERPL-SCNC: 25 MMOL/L (ref 23–29)
CREAT SERPL-MCNC: 3.7 MG/DL (ref 0.5–1.4)
DIASTOLIC DYSFUNCTION: NO
DIFFERENTIAL METHOD: ABNORMAL
EOSINOPHIL # BLD AUTO: 0.1 K/UL (ref 0–0.5)
EOSINOPHIL NFR BLD: 0.9 % (ref 0–8)
ERYTHROCYTE [DISTWIDTH] IN BLOOD BY AUTOMATED COUNT: 17.6 % (ref 11.5–14.5)
EST. GFR  (AFRICAN AMERICAN): 16 ML/MIN/1.73 M^2
EST. GFR  (NON AFRICAN AMERICAN): 14 ML/MIN/1.73 M^2
ESTIMATED PA SYSTOLIC PRESSURE: 37.95
GLUCOSE FLD-MCNC: 122 MG/DL
GLUCOSE SERPL-MCNC: 103 MG/DL (ref 70–110)
HCT VFR BLD AUTO: 34.4 % (ref 40–54)
HGB BLD-MCNC: 10.3 G/DL (ref 14–18)
IMM GRANULOCYTES # BLD AUTO: 0.1 K/UL (ref 0–0.04)
IMM GRANULOCYTES NFR BLD AUTO: 0.8 % (ref 0–0.5)
LDH FLD L TO P-CCNC: 120 U/L
LYMPHOCYTES # BLD AUTO: 1 K/UL (ref 1–4.8)
LYMPHOCYTES NFR BLD: 8.4 % (ref 18–48)
MAGNESIUM SERPL-MCNC: 2 MG/DL (ref 1.6–2.6)
MCH RBC QN AUTO: 27.5 PG (ref 27–31)
MCHC RBC AUTO-ENTMCNC: 29.9 G/DL (ref 32–36)
MCV RBC AUTO: 92 FL (ref 82–98)
MITRAL VALVE REGURGITATION: ABNORMAL
MONOCYTES # BLD AUTO: 1 K/UL (ref 0.3–1)
MONOCYTES NFR BLD: 7.9 % (ref 4–15)
NEUTROPHILS # BLD AUTO: 10.1 K/UL (ref 1.8–7.7)
NEUTROPHILS NFR BLD: 81.9 % (ref 38–73)
NRBC BLD-RTO: 0 /100 WBC
PHOSPHATE SERPL-MCNC: 3.3 MG/DL (ref 2.7–4.5)
PLATELET # BLD AUTO: 319 K/UL (ref 150–350)
PMV BLD AUTO: 10.2 FL (ref 9.2–12.9)
POCT GLUCOSE: 114 MG/DL (ref 70–110)
POTASSIUM SERPL-SCNC: 3.3 MMOL/L (ref 3.5–5.1)
PROT FLD-MCNC: 3.4 G/DL
RBC # BLD AUTO: 3.74 M/UL (ref 4.6–6.2)
RETIRED EF AND QEF - SEE NOTES: 60 (ref 55–65)
SODIUM SERPL-SCNC: 143 MMOL/L (ref 136–145)
SPECIMEN SOURCE: NORMAL
WBC # BLD AUTO: 12.28 K/UL (ref 3.9–12.7)

## 2020-01-11 PROCEDURE — 90935 HEMODIALYSIS ONE EVALUATION: CPT | Mod: ,,, | Performed by: INTERNAL MEDICINE

## 2020-01-11 PROCEDURE — 36415 COLL VENOUS BLD VENIPUNCTURE: CPT

## 2020-01-11 PROCEDURE — 25000003 PHARM REV CODE 250: Performed by: PHYSICIAN ASSISTANT

## 2020-01-11 PROCEDURE — 90935 PR HEMODIALYSIS, ONE EVALUATION: ICD-10-PCS | Mod: ,,, | Performed by: INTERNAL MEDICINE

## 2020-01-11 PROCEDURE — 84100 ASSAY OF PHOSPHORUS: CPT

## 2020-01-11 PROCEDURE — G0257 UNSCHED DIALYSIS ESRD PT HOS: HCPCS

## 2020-01-11 PROCEDURE — 80100016 HC MAINTENANCE HEMODIALYSIS

## 2020-01-11 PROCEDURE — 85025 COMPLETE CBC W/AUTO DIFF WBC: CPT

## 2020-01-11 PROCEDURE — 80048 BASIC METABOLIC PNL TOTAL CA: CPT

## 2020-01-11 PROCEDURE — G0378 HOSPITAL OBSERVATION PER HR: HCPCS

## 2020-01-11 PROCEDURE — 83735 ASSAY OF MAGNESIUM: CPT

## 2020-01-11 RX ORDER — HEPARIN SODIUM 1000 [USP'U]/ML
2000 INJECTION, SOLUTION INTRAVENOUS; SUBCUTANEOUS ONCE
Status: CANCELLED | OUTPATIENT
Start: 2020-01-11

## 2020-01-11 RX ADMIN — METOPROLOL TARTRATE 50 MG: 50 TABLET ORAL at 08:01

## 2020-01-11 RX ADMIN — TAMSULOSIN HYDROCHLORIDE 0.4 MG: 0.4 CAPSULE ORAL at 08:01

## 2020-01-11 RX ADMIN — SEVELAMER CARBONATE 800 MG: 800 TABLET, FILM COATED ORAL at 06:01

## 2020-01-11 RX ADMIN — FOLIC ACID 1 MG: 1 TABLET ORAL at 08:01

## 2020-01-11 RX ADMIN — PANTOPRAZOLE SODIUM 40 MG: 40 TABLET, DELAYED RELEASE ORAL at 08:01

## 2020-01-11 RX ADMIN — SEVELAMER CARBONATE 800 MG: 800 TABLET, FILM COATED ORAL at 11:01

## 2020-01-11 RX ADMIN — AMLODIPINE BESYLATE 2.5 MG: 2.5 TABLET ORAL at 08:01

## 2020-01-11 NOTE — DISCHARGE SUMMARY
[facility] Hospital Medicine  Discharge Summary      Patient Name: Howie Rodgers  MRN: [id number]  Admission Date: 1/9/2020  Hospital Length of Stay: 0 days  Discharge Date and Time:  01/11/2020 4:06 PM  Attending Physician: No att. providers found   Discharging Provider: Sunni Headley NP  Primary Care Provider: Magdi Julio MD      HPI:   Howie Rodgers is an 85 year old male with combined heart failure, ESRD on dialysis Mondays, Wednesdays and Fridays who resides at [facility] and presented to the [facility] ED with reports of a fall out of his wheelchair prior to presentation. The patient was then found to have an increase in the size of his right sided pleural effusion. He cannot provide any history due to a baseline altered mental status. Of note, the patient was seen in the [facility] ED on 12/31/19 and 1/2/20 due to similar falls. Labs in the ED are significant for a potassium of 3.1, creatinine of 2.8, troponin of 0.18 and BNP of > 4,900. CT scans of his head, cervical spine, chest, abdomen and pelvis were essential negative with the exception of a worsening right pleural effusion and a left pectoral hematoma and low grade rectal fecal impaction. Per the patient's [place], he is a full code. Ally Ward is his POA.     * No surgery found *      Hospital Course:   Pt had a thoracentesis that removed 1300 ml fluid. Also, he had dialysis during stay. Pt was stable and had no respiratory issues. He was cleared for discharge by Nephrology after dialysis was complete. Pt was seen and examined and determined to be safe and stable for discharge. He would return to the nursing home where he would be followed by nursing home provider and he would resume his normal dialysis schedule.      Consults:   Consults (From admission, onward)        Status Ordering Provider     Inpatient consult to Interventional Radiology  Once     Provider:  (Not yet assigned)    Completed ELIANE  GUILLERMINA     Inpatient consult to Nephrology  Once     Provider:  Naya Edmonds MD    Acknowledged GUILLERMINA DEL RIO          No new Assessment & Plan notes have been filed under this hospital service since the last note was generated.  Service: Hospital Medicine    Final Active Diagnoses:    Diagnosis Date Noted POA    PRINCIPAL PROBLEM:  Pleural effusion, right [J90] 01/09/2020 Yes    COPD (chronic obstructive pulmonary disease) [J44.9]  Yes    Hypertension [I10]  Yes    Hyperlipemia [E78.5]  Yes    ESRD (end stage renal disease) on dialysis [N18.6, Z99.2] 08/02/2017 Not Applicable    Debility [R53.81] 10/06/2016 Yes      Problems Resolved During this Admission:    Diagnosis Date Noted Date Resolved POA    Acute on chronic combined systolic and diastolic heart failure [I50.43] 01/10/2020 01/11/2020 Yes    Elevated troponin [R79.89] 01/10/2020 01/11/2020 Yes       Discharged Condition: stable    Disposition: Long Term Care    Follow Up:  Follow-up Information     Magdi Julio MD In 3 days.    Specialty:  Internal Medicine  Why:  Hospital Follow Up - Chronic Pleural Effusion and dialysis  Contact information:  [id number] [facility] [address]  [phone]             Dialysis.    Why:  Resume dialysis according to your schedule               Patient Instructions:      Notify your health care provider if you experience any of the following:  temperature >100.4     Notify your health care provider if you experience any of the following:  difficulty breathing or increased cough     Activity as tolerated       Significant Diagnostic Studies: Labs:   CMP   Recent Labs   Lab 01/09/20 1957 01/11/20  0439    143   K 3.1* 3.3*   CL 99 102   CO2 29 25   * 103   BUN 27* 40*   CREATININE 2.8* 3.7*   CALCIUM 10.0 9.7   PROT 7.5  --    ALBUMIN 3.2*  --    BILITOT 1.2*  --    ALKPHOS 101  --    AST 40  --    ALT 25  --    ANIONGAP 14 16   ESTGFRAFRICA 22.7* 16*   EGFRNONAA 19.7* 14*     and CBC   Recent Labs   Lab 01/09/20 1957 01/11/20  0439   WBC 13.59* 12.28   HGB 11.4* 10.3*   HCT 37.5* 34.4*   * 319       Pending Diagnostic Studies:     Procedure Component Value Units Date/Time    Cytology, Fluid/Wash/Brush [[id number]] Collected:  01/10/20 1542    Order Status:  Sent Lab Status:  In process Updated:  01/10/20 1542         Medications:  Reconciled Home Medications:      Medication List      CONTINUE taking these medications    acetaminophen 325 MG tablet  Commonly known as:  TYLENOL  Take 1 tablet (325 mg total) by mouth every 6 (six) hours as needed for Pain.     amLODIPine 2.5 MG tablet  Commonly known as:  NORVASC  Take 2.5 mg by mouth 2 (two) times daily.     aspirin 81 MG Chew  Take 162 mg by mouth 2 (two) times daily.     escitalopram oxalate 10 MG tablet  Commonly known as:  LEXAPRO  Take 10 mg by mouth every evening.     Flomax 0.4 mg Cap  Generic drug:  tamsulosin  Take 0.4 mg by mouth once daily.     folic acid 1 MG tablet  Commonly known as:  FOLVITE  Take 1 mg by mouth once daily.     HYDROcodone-acetaminophen  mg per tablet  Commonly known as:  NORCO  Take 1 tablet by mouth every 4 (four) hours as needed for Pain.     metoprolol tartrate 50 MG tablet  Commonly known as:  LOPRESSOR  Take 50 mg by mouth 2 (two) times daily.     pantoprazole 40 MG tablet  Commonly known as:  PROTONIX  Take 40 mg by mouth once daily.     PROMOD PROTEIN ORAL  Take 30 mLs by mouth once daily.     RENAL VITAMIN ORAL  Take 1 tablet by mouth once daily.     RENVELA ORAL  Take 800 mg by mouth 3 (three) times daily before meals.     ROBITUSSIN COUGH & COLD ORAL  Take by mouth.            Indwelling Lines/Drains at time of discharge:   Lines/Drains/Airways     Drain                 Hemodialysis AV Fistula 01/11/20 1512 Right upper arm less than 1 day                Time spent on the discharge of patient: > 30 minutes  Patient was seen and examined on the date of discharge and determined to be  suitable for discharge.         Sunni Headley NP  Department of Hospital Medicine  [facility]

## 2020-01-11 NOTE — HOSPITAL COURSE
Pt had a thoracentesis that removed 1300 ml fluid. Also, he had dialysis during stay. Pt was stable and had no respiratory issues. He was cleared for discharge by Nephrology after dialysis was complete. Pt was seen and examined and determined to be safe and stable for discharge. He would return to the nursing home where he would be followed by nursing home provider and he would resume his normal dialysis schedule.

## 2020-01-11 NOTE — PROCEDURES
"Howie Rodgers is a 85 y.o. male patient.    Temp: 97.8 °F (36.6 °C) (01/11/20 1308)  Pulse: 78 (01/11/20 1308)  Resp: 18 (01/11/20 1308)  BP: 132/64 (01/11/20 1308)  SpO2: 96 % (01/11/20 1308)  Weight: 58.6 kg (129 lb 3 oz) (01/11/20 0500)  Height: 5' 8" (172.7 cm) (01/09/20 1938)       Prepare patient for dialysis  Date/Time: 1/11/2020 1:53 PM  Performed by: Naya Edmonds MD  Authorized by: Naya Edmonds MD        Patient seen on dialysis.  Tolerating well.  Status post thoracentesis of 1.3 L from right side yesterday.  Okay to discharge back to the nursing home    Naya Edmonds  1/11/2020    "

## 2020-01-11 NOTE — NURSING
Pt d/c after dialysis per NP. Called Shanelle Gupta and updated on pt statues. Gave report to LUCIO Zapata. Agree to inform facility when pt is finishing up dialysis for transportation arrangement. Will continue to monitor

## 2020-01-13 LAB — PATH INTERP FLD-IMP: NORMAL

## 2020-01-14 LAB — BACTERIA SPEC AEROBE CULT: NO GROWTH

## 2020-01-16 LAB — FINAL PATHOLOGIC DIAGNOSIS: NORMAL

## 2020-01-20 LAB — BACTERIA SPEC ANAEROBE CULT: NORMAL

## 2020-03-14 LAB
ACID FAST MOD KINY STN SPEC: NORMAL
MYCOBACTERIUM SPEC QL CULT: NORMAL

## 2021-04-29 ENCOUNTER — PATIENT MESSAGE (OUTPATIENT)
Dept: RESEARCH | Facility: HOSPITAL | Age: 86
End: 2021-04-29

## 2021-07-01 ENCOUNTER — PATIENT MESSAGE (OUTPATIENT)
Dept: ADMINISTRATIVE | Facility: OTHER | Age: 86
End: 2021-07-01